# Patient Record
Sex: FEMALE | Race: WHITE | NOT HISPANIC OR LATINO | URBAN - METROPOLITAN AREA
[De-identification: names, ages, dates, MRNs, and addresses within clinical notes are randomized per-mention and may not be internally consistent; named-entity substitution may affect disease eponyms.]

---

## 2024-04-09 ENCOUNTER — OFFICE (OUTPATIENT)
Dept: URBAN - METROPOLITAN AREA CLINIC 4 | Facility: CLINIC | Age: 74
End: 2024-04-09

## 2024-04-09 VITALS
WEIGHT: 136 LBS | SYSTOLIC BLOOD PRESSURE: 150 MMHG | DIASTOLIC BLOOD PRESSURE: 66 MMHG | SYSTOLIC BLOOD PRESSURE: 142 MMHG | DIASTOLIC BLOOD PRESSURE: 72 MMHG

## 2024-04-09 DIAGNOSIS — R94.5 ABNORMAL RESULTS OF LIVER FUNCTION STUDIES: ICD-10-CM

## 2024-04-09 PROCEDURE — 99204 OFFICE O/P NEW MOD 45 MIN: CPT | Performed by: NURSE PRACTITIONER

## 2024-07-25 ENCOUNTER — OFFICE VISIT (OUTPATIENT)
Dept: GASTROENTEROLOGY | Facility: CLINIC | Age: 74
End: 2024-07-25
Payer: MEDICARE

## 2024-07-25 VITALS
OXYGEN SATURATION: 97 % | HEIGHT: 60 IN | HEART RATE: 81 BPM | TEMPERATURE: 98.4 F | SYSTOLIC BLOOD PRESSURE: 128 MMHG | BODY MASS INDEX: 25.13 KG/M2 | DIASTOLIC BLOOD PRESSURE: 70 MMHG | WEIGHT: 128 LBS

## 2024-07-25 DIAGNOSIS — R79.89 ABNORMAL LIVER FUNCTION TESTS: Primary | ICD-10-CM

## 2024-07-25 DIAGNOSIS — M79.604 RIGHT LEG PAIN: ICD-10-CM

## 2024-07-25 DIAGNOSIS — Z80.0 FAMILY HX OF COLON CANCER: ICD-10-CM

## 2024-07-25 PROCEDURE — 1160F RVW MEDS BY RX/DR IN RCRD: CPT | Performed by: INTERNAL MEDICINE

## 2024-07-25 PROCEDURE — 99204 OFFICE O/P NEW MOD 45 MIN: CPT | Performed by: INTERNAL MEDICINE

## 2024-07-25 PROCEDURE — 1159F MED LIST DOCD IN RCRD: CPT | Performed by: INTERNAL MEDICINE

## 2024-07-25 RX ORDER — NICOTINE 21 MG/24HR
PATCH, TRANSDERMAL 24 HOURS TRANSDERMAL EVERY 24 HOURS
COMMUNITY

## 2024-07-25 RX ORDER — CYCLOSPORINE 0.5 MG/ML
EMULSION OPHTHALMIC
COMMUNITY
Start: 2024-05-20

## 2024-07-25 RX ORDER — ATORVASTATIN CALCIUM 40 MG/1
40 TABLET, FILM COATED ORAL DAILY
COMMUNITY

## 2024-07-25 RX ORDER — FLUOXETINE HYDROCHLORIDE 20 MG/1
CAPSULE ORAL
COMMUNITY

## 2024-07-25 RX ORDER — LISINOPRIL 10 MG/1
TABLET ORAL
COMMUNITY

## 2024-07-25 RX ORDER — BRIMONIDINE TARTRATE 2 MG/ML
SOLUTION/ DROPS OPHTHALMIC
COMMUNITY
Start: 2024-06-07

## 2024-07-25 NOTE — PROGRESS NOTES
New Patient Consultation     Patient Name: Emma Harper  : 1950   MRN: [id number]     No chief complaint on file.      History of Present Illness: Emma Harper is a 73 y.o. female, PMH includes ***, who is here today for a Gastroenterology Consultation for ***    Patient denies personal or FHx of PUD, H Pylori, gastritis, pancreatitis, colitis, Celiac disease, UC, Crohn's disease, IBS, colon or gastric cancers. Pt denies EtOH, tobacco, illicit substance or NSAID use.    Last EGD: ***  Last Colon: ***    Subjective      Review of Systems    No past medical history on file.    No past surgical history on file.    No family history on file.    Social History     Socioeconomic History    Marital status:        Social History     Substance and Sexual Activity   Alcohol Use Not on file     Social History     Tobacco Use   Smoking Status Not on file   Smokeless Tobacco Not on file       No current outpatient medications on file.    Not on File    Objective     Physical Exam:  There were no vitals filed for this visit.  There is no height or weight on file to calculate BMI.     Physical Exam    Assessment / Plan      There are no diagnoses linked to this encounter.    Health Maintenance  ***    Follow Up:   No follow-ups on file.    Plan of care reviewed with the patient at the conclusion of today's visit.  Education was provided regarding diagnosis, management, and any prescribed or recommended OTC medications.  Patient verbalized understanding of and agreement with management plan.     NOTE TO PATIENT: The  Century Cures Act makes medical notes like these available to patients in the interest of transparency. However, be advised this is a medical document. It is intended as peer to peer communication. It is written in medical language and may contain abbreviations or verbiage that are unfamiliar. It may appear blunt or direct. Medical documents are intended to carry relevant information, facts as  evident, and the clinical opinion of the practitioner.     Katherin Rios MD   [facility]    Part of this note may be an electronic transcription/translation of spoken language to printed text using the Dragon Dictation System.

## 2024-07-25 NOTE — PROGRESS NOTES
New Patient Consultation     Patient Name: Emma Harper  : 1950   MRN: [id number]     Chief Complaint   Patient presents with    Abnormal Lab       History of Present Illness: Emma Harper is a 73 y.o. female, PMH includes question of polycythemia recorded on notes from Dr. Ribeiro office  (as patient does not remember if she has this)  Patient has difficulty hearing and here with daughter who is here today for a Gastroenterology Consultation for abnormal liver disease work up    She has no jaundice.  No GI symptoms    She drove 3 hours from [place] here and is with daughter    Patient had abnormal liver tests on evaluation for leg pain( level 10) by PCP. Patient has right leg pain the entire leg.  Level 2.  Patient reports can be regional but the entire leg, no deficits in moving it; She does not think sciatica; Not a cramp  She has not have further evaluation for leg pain      This Spring, Patient originally saw  NP with Cristobal Ribeiro MD who had initially started the liver work up.  Patient reports started work up and reports that her liver diagnosis was autoimmune hepatitis and MAFLD.  She was then referred to me from Dr. 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 Pt denies EtOH, tobacco, illicit substance or NSAID use.    Last EGD: none  Last Colon:  Balta Fuentes    Subjective      Review of Systems   Constitutional: Negative.    HENT: Negative.     Eyes: Negative.    Respiratory: Negative.     Cardiovascular: Negative.    Gastrointestinal: Negative.    Endocrine: Negative.    Genitourinary: Negative.     Musculoskeletal: Negative.    Skin: Negative.    Allergic/Immunologic: Negative.    Neurological: Negative.    Hematological: Negative.    Psychiatric/Behavioral: Negative.         No past medical history on file.    Past Surgical History:   Procedure Laterality Date    APPENDECTOMY      COLONOSCOPY      TUBAL ABDOMINAL LIGATION         Family History   Problem Relation Age of Onset    Colon polyps Brother     Colon cancer Brother     Esophageal cancer Neg Hx        Social History     Socioeconomic History    Marital status:    Tobacco Use    Smoking status: Every Day     Current packs/day: 0.25     Types: Cigarettes   Vaping Use    Vaping status: Never Used   Substance and Sexual Activity    Alcohol use: Not Currently    Drug use: Defer    Sexual activity: Defer       Social History     Substance and Sexual Activity   Alcohol Use Not Currently     Social History     Tobacco Use   Smoking Status Every Day    Current packs/day: 0.25    Types: Cigarettes   Smokeless Tobacco Not on file         Current Outpatient Medications:     atorvastatin (LIPITOR) 40 MG tablet, Take 1 tablet by mouth Daily., Disp: , Rfl:     brimonidine (ALPHAGAN) 0.2 % ophthalmic solution, INSTILL 1 DROP INTO BOTH EYES EVERY DAY AS NEEDED, Disp: , Rfl:     FLUoxetine (PROzac) 20 MG capsule, take 1 capsule by mouth every day for 90 days, Disp: , Rfl:     lisinopril (PRINIVIL,ZESTRIL) 10 MG tablet, take 1 tablet by mouth every day for 90 days, Disp: , Rfl:     metoprolol tartrate (LOPRESSOR) 25 MG tablet, Take 1 tablet by mouth 2 (Two) Times a Day With Meals., Disp: , Rfl:     nicotine (Nicoderm CQ) 21 MG/24HR patch, Daily. (Patient not taking: Reported on 7/25/2024), Disp: , Rfl:     Restasis 0.05 % ophthalmic emulsion, INSTILL 1 DROP INTO AFFECTED EYE TWICE A DAY (Patient not taking: Reported on 7/25/2024), Disp: , Rfl:     Allergies   Allergen Reactions    Penicillins Rash       Objective     Physical Exam:  Vitals:    07/25/24 1513  "  BP: 128/70   Pulse: 81   Temp: 98.4 °F (36.9 °C)   SpO2: 97%   Weight: 58.1 kg (128 lb)   Height: 152.4 cm (60\")     Body mass index is 25 kg/m².     Physical Exam  Constitutional:       Appearance: Normal appearance.   Pulmonary:      Effort: Pulmonary effort is normal.      Breath sounds: Normal breath sounds.   Abdominal:      General: Abdomen is flat. Bowel sounds are normal.      Palpations: Abdomen is soft.   Neurological:      General: No focal deficit present.      Mental Status: She is alert.   Psychiatric:         Mood and Affect: Mood normal.         Behavior: Behavior normal.         Assessment / Plan      1. Abnormal liver function tests  Serology concerning for AIH  Explained disease and cirrhosis  Liver biopsy ordered  Refer to [facility] after biopsy      2. Family hx of colon cancer  Last colonoscopy 2022 Dr. Fuentes    3. Right leg pain  Explained the risk benefit of tylenol and nsaids  Encouraged patient to continue work as I do not think it is entirely related to liver disease  Patient told to discuss with her PCP consideration for orthopedic evaluation    Health Maintenance  Last colonoscopy 2022 by Dr. Fuentes    Follow Up:   After biopsy    Plan of care reviewed with the patient at the conclusion of today's visit.  Education was provided regarding diagnosis, management, and any prescribed or recommended OTC medications.  Patient verbalized understanding of and agreement with management plan.     NOTE TO PATIENT: The 21st Century Cures Act makes medical notes like these available to patients in the interest of transparency. However, be advised this is a medical document. It is intended as peer to peer communication. It is written in medical language and may contain abbreviations or verbiage that are unfamiliar. It may appear blunt or direct. Medical documents are intended to carry relevant information, facts as evident, and the clinical opinion of the practitioner.     Katherin Rios MD   [facility]    Part of this note may be an electronic transcription/translation of spoken language to printed text using the Dragon Dictation System.

## 2024-08-14 ENCOUNTER — TELEPHONE (OUTPATIENT)
Dept: INFUSION THERAPY | Facility: HOSPITAL | Age: 74
End: 2024-08-14
Payer: MEDICARE

## 2024-08-14 ENCOUNTER — PREP FOR SURGERY (OUTPATIENT)
Dept: OTHER | Facility: HOSPITAL | Age: 74
End: 2024-08-14
Payer: MEDICARE

## 2024-08-14 RX ORDER — CETIRIZINE HYDROCHLORIDE 10 MG/1
10 TABLET ORAL DAILY
COMMUNITY

## 2024-08-14 RX ORDER — SODIUM CHLORIDE 0.9 % (FLUSH) 0.9 %
10 SYRINGE (ML) INJECTION AS NEEDED
Status: CANCELLED | OUTPATIENT
Start: 2024-08-14

## 2024-08-14 NOTE — TELEPHONE ENCOUNTER
Pre-procedure call- verified appointment time, NPO after midnight, needs a . Reviewed allergies, meds and history and pl;an of care day of procedure.

## 2024-08-16 ENCOUNTER — HOSPITAL ENCOUNTER (OUTPATIENT)
Dept: ULTRASOUND IMAGING | Facility: HOSPITAL | Age: 74
Discharge: HOME | End: 2024-08-16
Payer: MEDICARE

## 2024-08-16 VITALS
HEART RATE: 66 BPM | TEMPERATURE: 97.6 F | WEIGHT: 126.6 LBS | BODY MASS INDEX: 25.52 KG/M2 | DIASTOLIC BLOOD PRESSURE: 52 MMHG | OXYGEN SATURATION: 95 % | RESPIRATION RATE: 13 BRPM | SYSTOLIC BLOOD PRESSURE: 106 MMHG | HEIGHT: 59 IN

## 2024-08-16 DIAGNOSIS — R79.89 ABNORMAL LIVER FUNCTION TESTS: ICD-10-CM

## 2024-08-16 LAB
BASOPHILS # BLD AUTO: 0.11 10*3/MM3 (ref 0–0.2)
BASOPHILS NFR BLD AUTO: 1.3 % (ref 0–1.5)
DEPRECATED RDW RBC AUTO: 44.6 FL (ref 37–54)
EOSINOPHIL # BLD AUTO: 0.16 10*3/MM3 (ref 0–0.4)
EOSINOPHIL NFR BLD AUTO: 1.8 % (ref 0.3–6.2)
ERYTHROCYTE [DISTWIDTH] IN BLOOD BY AUTOMATED COUNT: 14.5 % (ref 12.3–15.4)
HCT VFR BLD AUTO: 48.1 % (ref 34–46.6)
HGB BLD-MCNC: 16 G/DL (ref 12–15.9)
IMM GRANULOCYTES # BLD AUTO: 0.04 10*3/MM3 (ref 0–0.05)
IMM GRANULOCYTES NFR BLD AUTO: 0.5 % (ref 0–0.5)
INR PPP: 1.04 (ref 0.89–1.12)
LYMPHOCYTES # BLD AUTO: 2.93 10*3/MM3 (ref 0.7–3.1)
LYMPHOCYTES NFR BLD AUTO: 33.6 % (ref 19.6–45.3)
MCH RBC QN AUTO: 28.3 PG (ref 26.6–33)
MCHC RBC AUTO-ENTMCNC: 33.3 G/DL (ref 31.5–35.7)
MCV RBC AUTO: 85.1 FL (ref 79–97)
MONOCYTES # BLD AUTO: 0.85 10*3/MM3 (ref 0.1–0.9)
MONOCYTES NFR BLD AUTO: 9.7 % (ref 5–12)
NEUTROPHILS NFR BLD AUTO: 4.63 10*3/MM3 (ref 1.7–7)
NEUTROPHILS NFR BLD AUTO: 53.1 % (ref 42.7–76)
NRBC BLD AUTO-RTO: 0 /100 WBC (ref 0–0.2)
PLATELET # BLD AUTO: 218 10*3/MM3 (ref 140–450)
PMV BLD AUTO: 10 FL (ref 6–12)
PROTHROMBIN TIME: 13.7 SECONDS (ref 12.2–14.5)
RBC # BLD AUTO: 5.65 10*6/MM3 (ref 3.77–5.28)
WBC NRBC COR # BLD AUTO: 8.72 10*3/MM3 (ref 3.4–10.8)

## 2024-08-16 PROCEDURE — 25010000002 MIDAZOLAM PER 1 MG: Performed by: RADIOLOGY

## 2024-08-16 PROCEDURE — 85610 PROTHROMBIN TIME: CPT | Performed by: NURSE PRACTITIONER

## 2024-08-16 PROCEDURE — 25010000002 FENTANYL CITRATE (PF) 50 MCG/ML SOLUTION: Performed by: RADIOLOGY

## 2024-08-16 PROCEDURE — 85025 COMPLETE CBC W/AUTO DIFF WBC: CPT | Performed by: NURSE PRACTITIONER

## 2024-08-16 PROCEDURE — 76942 ECHO GUIDE FOR BIOPSY: CPT

## 2024-08-16 RX ORDER — FENTANYL CITRATE 50 UG/ML
INJECTION, SOLUTION INTRAMUSCULAR; INTRAVENOUS
Status: DISPENSED
Start: 2024-08-16 | End: 2024-08-16

## 2024-08-16 RX ORDER — MIDAZOLAM HYDROCHLORIDE 1 MG/ML
INJECTION INTRAMUSCULAR; INTRAVENOUS
Status: DISPENSED
Start: 2024-08-16 | End: 2024-08-16

## 2024-08-16 RX ORDER — HYDROCODONE BITARTRATE AND ACETAMINOPHEN 5; 325 MG/1; MG/1
1 TABLET ORAL EVERY 4 HOURS PRN
Status: DISCONTINUED | OUTPATIENT
Start: 2024-08-16 | End: 2024-08-17 | Stop reason: HOSPADM

## 2024-08-16 RX ORDER — SODIUM CHLORIDE 0.9 % (FLUSH) 0.9 %
10 SYRINGE (ML) INJECTION AS NEEDED
Status: DISCONTINUED | OUTPATIENT
Start: 2024-08-16 | End: 2024-08-17 | Stop reason: HOSPADM

## 2024-08-16 RX ORDER — LIDOCAINE HYDROCHLORIDE 10 MG/ML
5 INJECTION, SOLUTION EPIDURAL; INFILTRATION; INTRACAUDAL; PERINEURAL ONCE
Status: COMPLETED | OUTPATIENT
Start: 2024-08-16 | End: 2024-08-16

## 2024-08-16 RX ORDER — FENTANYL CITRATE 50 UG/ML
INJECTION, SOLUTION INTRAMUSCULAR; INTRAVENOUS AS NEEDED
Status: COMPLETED | OUTPATIENT
Start: 2024-08-16 | End: 2024-08-16

## 2024-08-16 RX ORDER — MIDAZOLAM HYDROCHLORIDE 1 MG/ML
INJECTION INTRAMUSCULAR; INTRAVENOUS AS NEEDED
Status: COMPLETED | OUTPATIENT
Start: 2024-08-16 | End: 2024-08-16

## 2024-08-16 RX ADMIN — FENTANYL CITRATE 25 MCG: 50 INJECTION, SOLUTION INTRAMUSCULAR; INTRAVENOUS at 09:01

## 2024-08-16 RX ADMIN — MIDAZOLAM HYDROCHLORIDE 0.5 MG: 1 INJECTION, SOLUTION INTRAMUSCULAR; INTRAVENOUS at 08:56

## 2024-08-16 RX ADMIN — LIDOCAINE HYDROCHLORIDE 5 ML: 10 INJECTION, SOLUTION EPIDURAL; INFILTRATION; INTRACAUDAL; PERINEURAL at 09:09

## 2024-08-16 RX ADMIN — MIDAZOLAM HYDROCHLORIDE 0.5 MG: 1 INJECTION, SOLUTION INTRAMUSCULAR; INTRAVENOUS at 09:01

## 2024-08-16 RX ADMIN — FENTANYL CITRATE 25 MCG: 50 INJECTION, SOLUTION INTRAMUSCULAR; INTRAVENOUS at 08:56

## 2024-08-16 NOTE — PRE-PROCEDURE NOTE
"[facility]   Vascular Interventional Radiology  History & Physicial        Patient Name:Emma Harper    : 1950    MRN: [id number]    Primary Care Physician: Mansoor Barragan PA    Referring Physician: Katherin Rios MD     Date of admission: 2024    Subjective     Reason for Consult: Random liver biopsy    History of Present Illness     Emma Harper is a 74 y.o. female referred to IR as noted above.      Active Hospital Problems:  There are no active hospital problems to display for this patient.      Personal History     Past Medical History:   Diagnosis Date    Elevated liver enzymes     High cholesterol     Hypertension        Past Surgical History:   Procedure Laterality Date    APPENDECTOMY      COLONOSCOPY      TUBAL ABDOMINAL LIGATION         Family History: Her family history includes Colon cancer in her brother; Colon polyps in her brother.     Social History: She  reports that she has been smoking cigarettes. She does not have any smokeless tobacco history on file. She reports that she does not currently use alcohol. Drug use questions deferred to the physician.    Home Medications:  FLUoxetine, atorvastatin, brimonidine, cetirizine, cycloSPORINE, lisinopril, metoprolol tartrate, and nicotine    Current Medications:    fentaNYL citrate (PF)    midazolam    sodium chloride     Allergies:  Allergies   Allergen Reactions    Penicillins Rash       Review of Systems    IR Procedure pertinent significant findings are mentioned in the PMH and PSH above.    Objective     Visit Vitals  /69 (BP Location: Left arm, Patient Position: Lying)   Pulse 72   Temp 97.6 °F (36.4 °C)   Resp 16   Ht 149.9 cm (59\")   Wt 57.4 kg (126 lb 9.6 oz)   SpO2 94%   BMI 25.57 kg/m²        Physical Exam    A&Ox3.   Able to communicate  No Apparent Distress  Average physique   CVS: VS as noted. Chart reviewed. Stable for the procedure.  Respiratory: Non labored breathing. No signs of respiratory compromise.    Result " "Review      I have personally reviewed the results from the time of this admission to 8/16/2024 08:41 EDT and agree with these findings.  [x]  Laboratory  []  Microbiology  [x]  Radiology  []  EKG/Telemetry   []  Cardiology/Vascular   []  Pathology  []  Old records  []  Other:    Most notable findings include: As noted:    Results from last 7 days   Lab Units 08/16/24  0812   WBC 10*3/mm3 8.72   HEMOGLOBIN g/dL 16.0*   HEMATOCRIT % 48.1*   PLATELETS 10*3/mm3 218     INR pending.              CrCl cannot be calculated (No successful lab value found.). No results found for: \"CREATININE\"    No results found for: \"COVID19\"     No results found for: \"PREGTESTUR\", \"PREGSERUM\", \"HCG\", \"HCGQUANT\"     ASA SCALE ASSESSMENT (applicable ONLY if sedation planned):   1     MALLAMPATI CLASSIFICATION (applicable ONLY if sedation planned):   1    Assessment / Plan     Emma Harper is a 74 y.o. female referred to the IR service with above problem.    Plan:   As above.    Notice: The note was created before the performance of the procedure. It might have been left in the pending status and signed off after the procedure. The time stamp on the note may be misleading.    Zain Rodriguez MD   Vascular Interventional Radiology  08/16/24   8:40 AM EDT     "

## 2024-08-16 NOTE — POST-PROCEDURE NOTE
The following procedure was performed: Random liver biopsy    Please see corresponding Radiology report for in detail procedural information. The Radiology report will be dictated shortly, if not done so already. Please see the IR RN note for the information regarding medicines administered if any, alejandra-procedural vitals and I/O information.

## 2024-08-19 ENCOUNTER — TELEPHONE (OUTPATIENT)
Dept: INFUSION THERAPY | Facility: HOSPITAL | Age: 74
End: 2024-08-19
Payer: MEDICARE

## 2024-08-20 LAB
CYTO UR: NORMAL
LAB AP CASE REPORT: NORMAL
LAB AP CLINICAL INFORMATION: NORMAL
LAB AP DIAGNOSIS COMMENT: NORMAL
PATH REPORT.FINAL DX SPEC: NORMAL
PATH REPORT.GROSS SPEC: NORMAL

## 2024-08-27 ENCOUNTER — LAB (OUTPATIENT)
Dept: LAB | Facility: HOSPITAL | Age: 74
End: 2024-08-27
Payer: MEDICARE

## 2024-08-27 ENCOUNTER — OFFICE VISIT (OUTPATIENT)
Dept: GASTROENTEROLOGY | Facility: CLINIC | Age: 74
End: 2024-08-27
Payer: MEDICARE

## 2024-08-27 VITALS
HEART RATE: 86 BPM | WEIGHT: 126 LBS | SYSTOLIC BLOOD PRESSURE: 154 MMHG | DIASTOLIC BLOOD PRESSURE: 88 MMHG | OXYGEN SATURATION: 97 % | HEIGHT: 59 IN | BODY MASS INDEX: 25.4 KG/M2

## 2024-08-27 DIAGNOSIS — K75.4 AUTOIMMUNE HEPATITIS: ICD-10-CM

## 2024-08-27 DIAGNOSIS — T38.0X5A ADVERSE EFFECT OF GLUCOCORTICOIDS AND SYNTHETIC ANALOGUES, INITIAL ENCOUNTER: ICD-10-CM

## 2024-08-27 DIAGNOSIS — K75.4 AUTOIMMUNE HEPATITIS: Primary | ICD-10-CM

## 2024-08-27 LAB
ALBUMIN SERPL-MCNC: 4 G/DL (ref 3.5–5.2)
ALBUMIN/GLOB SERPL: 1 G/DL
ALP SERPL-CCNC: 139 U/L (ref 39–117)
ALT SERPL W P-5'-P-CCNC: 52 U/L (ref 1–33)
ANION GAP SERPL CALCULATED.3IONS-SCNC: 7.2 MMOL/L (ref 5–15)
AST SERPL-CCNC: 57 U/L (ref 1–32)
BASOPHILS # BLD AUTO: 0.1 10*3/MM3 (ref 0–0.2)
BASOPHILS NFR BLD AUTO: 1 % (ref 0–1.5)
BILIRUB SERPL-MCNC: 0.5 MG/DL (ref 0–1.2)
BUN SERPL-MCNC: 6 MG/DL (ref 8–23)
BUN/CREAT SERPL: 8.6 (ref 7–25)
CALCIUM SPEC-SCNC: 9.2 MG/DL (ref 8.6–10.5)
CHLORIDE SERPL-SCNC: 104 MMOL/L (ref 98–107)
CO2 SERPL-SCNC: 24.8 MMOL/L (ref 22–29)
CREAT SERPL-MCNC: 0.7 MG/DL (ref 0.57–1)
DEPRECATED RDW RBC AUTO: 42.6 FL (ref 37–54)
EGFRCR SERPLBLD CKD-EPI 2021: 91.5 ML/MIN/1.73
EOSINOPHIL # BLD AUTO: 0.1 10*3/MM3 (ref 0–0.4)
EOSINOPHIL NFR BLD AUTO: 1 % (ref 0.3–6.2)
ERYTHROCYTE [DISTWIDTH] IN BLOOD BY AUTOMATED COUNT: 13.7 % (ref 12.3–15.4)
GLOBULIN UR ELPH-MCNC: 4 GM/DL
GLUCOSE SERPL-MCNC: 113 MG/DL (ref 65–99)
HCT VFR BLD AUTO: 48.1 % (ref 34–46.6)
HGB BLD-MCNC: 16.2 G/DL (ref 12–15.9)
IMM GRANULOCYTES # BLD AUTO: 0.05 10*3/MM3 (ref 0–0.05)
IMM GRANULOCYTES NFR BLD AUTO: 0.5 % (ref 0–0.5)
INR PPP: 1.04 (ref 0.89–1.12)
LYMPHOCYTES # BLD AUTO: 1.83 10*3/MM3 (ref 0.7–3.1)
LYMPHOCYTES NFR BLD AUTO: 19.1 % (ref 19.6–45.3)
MCH RBC QN AUTO: 28.7 PG (ref 26.6–33)
MCHC RBC AUTO-ENTMCNC: 33.7 G/DL (ref 31.5–35.7)
MCV RBC AUTO: 85.3 FL (ref 79–97)
MONOCYTES # BLD AUTO: 0.57 10*3/MM3 (ref 0.1–0.9)
MONOCYTES NFR BLD AUTO: 6 % (ref 5–12)
NEUTROPHILS NFR BLD AUTO: 6.92 10*3/MM3 (ref 1.7–7)
NEUTROPHILS NFR BLD AUTO: 72.4 % (ref 42.7–76)
NRBC BLD AUTO-RTO: 0 /100 WBC (ref 0–0.2)
PLATELET # BLD AUTO: 226 10*3/MM3 (ref 140–450)
PMV BLD AUTO: 9.7 FL (ref 6–12)
POTASSIUM SERPL-SCNC: 4.2 MMOL/L (ref 3.5–5.2)
PROT SERPL-MCNC: 8 G/DL (ref 6–8.5)
PROTHROMBIN TIME: 13.7 SECONDS (ref 12.2–14.5)
RBC # BLD AUTO: 5.64 10*6/MM3 (ref 3.77–5.28)
SODIUM SERPL-SCNC: 136 MMOL/L (ref 136–145)
WBC NRBC COR # BLD AUTO: 9.57 10*3/MM3 (ref 3.4–10.8)

## 2024-08-27 PROCEDURE — 1159F MED LIST DOCD IN RCRD: CPT | Performed by: INTERNAL MEDICINE

## 2024-08-27 PROCEDURE — 84433 ASY THIOPURIN S-MTHYLTRNSFRS: CPT

## 2024-08-27 PROCEDURE — 36415 COLL VENOUS BLD VENIPUNCTURE: CPT | Performed by: INTERNAL MEDICINE

## 2024-08-27 PROCEDURE — 86682 HELMINTH ANTIBODY: CPT | Performed by: INTERNAL MEDICINE

## 2024-08-27 PROCEDURE — 1160F RVW MEDS BY RX/DR IN RCRD: CPT | Performed by: INTERNAL MEDICINE

## 2024-08-27 PROCEDURE — 86480 TB TEST CELL IMMUN MEASURE: CPT

## 2024-08-27 PROCEDURE — 99214 OFFICE O/P EST MOD 30 MIN: CPT | Performed by: INTERNAL MEDICINE

## 2024-08-27 PROCEDURE — 85025 COMPLETE CBC W/AUTO DIFF WBC: CPT

## 2024-08-27 PROCEDURE — 80053 COMPREHEN METABOLIC PANEL: CPT

## 2024-08-27 PROCEDURE — 85610 PROTHROMBIN TIME: CPT

## 2024-08-27 RX ORDER — PREDNISONE 5 MG/1
TABLET ORAL
Qty: 231 TABLET | Refills: 0 | Status: SHIPPED | OUTPATIENT
Start: 2024-08-27 | End: 2024-10-15

## 2024-08-27 NOTE — PROGRESS NOTES
Follow Up      Patient Name: Emma Harper  : 1950   MRN: [id number]     Chief Complaint   Patient presents with    Follow-up     Abnormal liver test        History of Present Illness: Emma Harper is a 74 y.o. female, PMH includes ***, who is here today for follow up on ***    Last EGD: ***  Last Colon: ***    Subjective      Review of Systems      Current Outpatient Medications:     atorvastatin (LIPITOR) 40 MG tablet, Take 1 tablet by mouth Daily., Disp: , Rfl:     brimonidine (ALPHAGAN) 0.2 % ophthalmic solution, INSTILL 1 DROP INTO BOTH EYES EVERY DAY AS NEEDED, Disp: , Rfl:     cetirizine (zyrTEC) 10 MG tablet, Take 1 tablet by mouth Daily., Disp: , Rfl:     FLUoxetine (PROzac) 20 MG capsule, take 1 capsule by mouth every day for 90 days, Disp: , Rfl:     lisinopril (PRINIVIL,ZESTRIL) 10 MG tablet, take 1 tablet by mouth every day for 90 days, Disp: , Rfl:     metoprolol tartrate (LOPRESSOR) 25 MG tablet, Take 1 tablet by mouth 2 (Two) Times a Day With Meals., Disp: , Rfl:     nicotine (Nicoderm CQ) 21 MG/24HR patch, Daily. (Patient not taking: Reported on 2024), Disp: , Rfl:     Restasis 0.05 % ophthalmic emulsion, INSTILL 1 DROP INTO AFFECTED EYE TWICE A DAY (Patient not taking: Reported on 2024), Disp: , Rfl:     Allergies   Allergen Reactions    Penicillins Rash       Social History     Socioeconomic History    Marital status:    Tobacco Use    Smoking status: Every Day     Current packs/day: 0.25     Types: Cigarettes   Vaping Use    Vaping status: Never Used   Substance and Sexual Activity    Alcohol use: Not Currently    Drug use: Defer    Sexual activity: Defer        Past Surgical History:   Procedure Laterality Date    APPENDECTOMY      COLONOSCOPY      TUBAL ABDOMINAL LIGATION          Past Medical History:   Diagnosis Date    Elevated liver enzymes     High cholesterol     Hypertension         Objective     Physical Exam:  There were no vitals filed for this  visit.  There is no height or weight on file to calculate BMI.     Physical Exam    Assessment / Plan      Assessment/Plan:   There are no diagnoses linked to this encounter.    Health Maintenance  ***    Follow Up:   No follow-ups on file.    Plan of care reviewed with the patient at the conclusion of today's visit.  Education was provided regarding diagnosis, management, and any prescribed or recommended OTC medications.  Patient verbalized understanding of and agreement with management plan.     NOTE TO PATIENT: The 21st Century Cures Act makes medical notes like these available to patients in the interest of transparency. However, be advised this is a medical document. It is intended as peer to peer communication. It is written in medical language and may contain abbreviations or verbiage that are unfamiliar. It may appear blunt or direct. Medical documents are intended to carry relevant information, facts as evident, and the clinical opinion of the practitioner.     Katherin Rios MD   [facility]    Part of this note may be an electronic transcription/translation of spoken language to printed text using the Dragon Dictation System.

## 2024-08-27 NOTE — PROGRESS NOTES
Follow Up      Patient Name: Emma Harper  : 1950   MRN: [id number]     Chief Complaint   Patient presents with    Follow-up     Abnormal liver test        History of Present Illness: Emma Harper is a 74 y.o. female, PMH includes dysplipidemia, htn worked as a , who is here today for follow up on liver biopsy that was consistent with autoimmune hepatitis  Patient reports feeling better.  Her leg pain has resolved.  She reports tolerated procedure well and was really complimentary of staff in IR  Prior to biopsy she saw NP with Dr. Ribeiro who found these:  Positive TIP and SMA of 91; elevated Igg of 2783; Negative AMA AST 96 and ALT 99; Alk phos 170' Fibrosure severe steatosis/LUCERO and satage 2 fibrosis (BMI + 26)       Last EGD: none  Last Colon: Gina did it     Subjective      Review of Systems   Constitutional: Negative.  Negative for activity change.   HENT: Negative.     Eyes: Negative.          Current Outpatient Medications:     atorvastatin (LIPITOR) 40 MG tablet, Take 1 tablet by mouth Daily., Disp: , Rfl:     brimonidine (ALPHAGAN) 0.2 % ophthalmic solution, INSTILL 1 DROP INTO BOTH EYES EVERY DAY AS NEEDED, Disp: , Rfl:     cetirizine (zyrTEC) 10 MG tablet, Take 1 tablet by mouth Daily., Disp: , Rfl:     FLUoxetine (PROzac) 20 MG capsule, take 1 capsule by mouth every day for 90 days, Disp: , Rfl:     lisinopril (PRINIVIL,ZESTRIL) 10 MG tablet, take 1 tablet by mouth every day for 90 days, Disp: , Rfl:     metoprolol tartrate (LOPRESSOR) 25 MG tablet, Take 1 tablet by mouth 2 (Two) Times a Day With Meals., Disp: , Rfl:     nicotine (Nicoderm CQ) 21 MG/24HR patch, Daily. (Patient not taking: Reported on 2024), Disp: , Rfl:     Restasis 0.05 % ophthalmic emulsion, INSTILL 1 DROP INTO AFFECTED EYE TWICE A DAY (Patient not taking: Reported on 2024), Disp: , Rfl:     Allergies   Allergen Reactions    Penicillins Rash       Social History     Socioeconomic History    Marital  "status:    Tobacco Use    Smoking status: Every Day     Current packs/day: 0.25     Types: Cigarettes   Vaping Use    Vaping status: Never Used   Substance and Sexual Activity    Alcohol use: Not Currently    Drug use: Defer    Sexual activity: Defer        Past Surgical History:   Procedure Laterality Date    APPENDECTOMY      COLONOSCOPY      TUBAL ABDOMINAL LIGATION          Past Medical History:   Diagnosis Date    Elevated liver enzymes     High cholesterol     Hypertension         Objective     Physical Exam:  Vitals:    08/27/24 0817   BP: 154/88   Pulse: 86   SpO2: 97%   Weight: 57.2 kg (126 lb)   Height: 149.9 cm (59.02\")     Body mass index is 25.44 kg/m².     Physical Exam  Constitutional:       Appearance: Normal appearance.   Neurological:      General: No focal deficit present.      Mental Status: She is alert and oriented to person, place, and time.   Psychiatric:         Mood and Affect: Mood normal.         Behavior: Behavior normal.       Clinical Information    Abnormal liver function tests   Final Diagnosis   LIVER, NEEDLE CORE BIOPSY:  Chronic portal hepatitis with plasma cells, grade 2, at least stage 1; see comment.   Electronically signed by Bob Gates MD on 8/20/2024 at 1531   Comment    As detailed in the microscopic description, the sample as some limitations predominantly related to fragmentation. Nonetheless, based on what is present, the liver shows a chronic portal hepatitis with plasma cells and at least some degree of interface activity. This would be compatible with a diagnosis of autoimmune hepatitis in the presence of otherwise supportive clinical and laboratory findings, although the histologic findings and cells are not entirely specific.   Gross Description    1. Liver.  Received in formalin labeled \"liver biopsy\" are 3 tan-white needle cores averaging 1.2 cm 1.2 cm long by 0.1 cm in diameter submitted entirely in Block 1A. HDM      Microscopic Description  "   Histologic sections demonstrate several fragments of needle core biopsies of the liver with a limited number of portal tracts for review. The portal tracts are involved by inflammation which appears predominantly to be lymphocytes with occasional plasma cells, although many portal tracts have crush artifact. It is favored there is some interface activity present but this is hard to establish with the fragmentation. Occasional bile ducts are seen without injury. The hepatic lobules demonstrate mild steatosis, around 15%, with no definitive balloon cell degeneration identified. A granulomas or acidophil bodies are appreciated. Special staining is performed with appropriately reactive controls. An iron stain is essentially negative. Trichrome stain demonstrates portal fibrosis but no definitive periportal fibrosis; the fragmentation makes assessment of the architecture of the extent of fibrous tissue difficult.   Resulting Agency [facility]              Specimen Collected: 08/16/24 09:09 EDT Last Resulted: 08/20/24 15:31 EDT              Assessment / Plan      Assessment/Plan:   1. Autoimmune hepatitis  Biopsy provenPositive TIP and SMA of 91; elevated Igg of 2783; Negative AMA  Discussed treatment options and  with her age and frame will need  Discussed risk on non treatment  Discussed risk of cirrhosis development     - QuantiFERON TB Plus Client Incubated; Future  - Comprehensive Metabolic Panel; Future  - CBC & Differential; Future  - Protime-INR; Future  - Thiopurine Methyltransferase; Future  - Strongyloides Antibody IgG, CYNTHIA  - need baseline DEXA scan      Will order  Prednisone 30 for one week then 20 for 2 weeks then 15 for 2 weeks then 10 mg for 1 weeks then recheck liver test in 4 to 6 weeks will plan on starting azathioprine once we know her metabolite issue  Discussed budesonide as well if she can not tolerate above    She does NOT have Cirrhosis but she was given information and would limit tylenol  and would encourage increased protein in diet    Will place [facility] referral to hepatology     She has had pneumovax and shingles; I strongly encourage covid booster and patient refuses.  Discussed how infection can acutely worsen liver function  Would consider repeat liver and spleen imaging in about 6 months      Health Maintenance  Last colonoscopy by Dr. Fuentes    Follow Up:   November or december    Plan of care reviewed with the patient at the conclusion of today's visit.  Education was provided regarding diagnosis, management, and any prescribed or recommended OTC medications.  Patient verbalized understanding of and agreement with management plan.     NOTE TO PATIENT: The 21st Century Cures Act makes medical notes like these available to patients in the interest of transparency. However, be advised this is a medical document. It is intended as peer to peer communication. It is written in medical language and may contain abbreviations or verbiage that are unfamiliar. It may appear blunt or direct. Medical documents are intended to carry relevant information, facts as evident, and the clinical opinion of the practitioner.     Katherin Rios MD   [facility] [place] [facility]    Part of this note may be an electronic transcription/translation of spoken language to printed text using the Dragon Dictation System.

## 2024-08-29 LAB
GAMMA INTERFERON BACKGROUND BLD IA-ACNC: 0.04 IU/ML
M TB IFN-G BLD-IMP: NEGATIVE
M TB IFN-G CD4+ BCKGRND COR BLD-ACNC: 0.04 IU/ML
M TB IFN-G CD4+CD8+ BCKGRND COR BLD-ACNC: 0.06 IU/ML
MITOGEN IGNF BCKGRD COR BLD-ACNC: >10 IU/ML
SERVICE CMNT-IMP: NORMAL
STRONGYLOIDES IGG SER QL IA: NEGATIVE

## 2024-09-03 DIAGNOSIS — K75.4 AUTOIMMUNE HEPATITIS: Primary | ICD-10-CM

## 2024-09-03 LAB
REF LAB TEST METHOD: NORMAL
TPMT GENE PROD MET ACT IMP BLD/T-IMP: NORMAL
TPMT RBC-CCNC: 11.9 UNITS/ML RBC

## 2024-10-15 ENCOUNTER — TELEPHONE (OUTPATIENT)
Dept: GASTROENTEROLOGY | Facility: CLINIC | Age: 74
End: 2024-10-15
Payer: MEDICARE

## 2024-10-15 DIAGNOSIS — Z78.0 MENOPAUSE: Primary | ICD-10-CM

## 2024-10-15 NOTE — TELEPHONE ENCOUNTER
Plan would be to start low dose azathioprine to replace the steroids.    I ordered labs 9/3 and want to look at these first   I reordered her DEXA

## 2024-10-15 NOTE — TELEPHONE ENCOUNTER
Patient states she is almost finished with the prednisone, and doesn't know what to do next?    Patient is unsure if you still want bloodwork done?

## 2024-10-18 DIAGNOSIS — K75.4 AUTOIMMUNE HEPATITIS: Primary | ICD-10-CM

## 2024-10-18 RX ORDER — AZATHIOPRINE 50 MG/1
50 TABLET ORAL DAILY
Qty: 30 TABLET | Refills: 1 | Status: SHIPPED | OUTPATIENT
Start: 2024-10-18

## 2024-10-18 RX ORDER — PREDNISONE 5 MG/1
5 TABLET ORAL DAILY
Qty: 14 TABLET | Refills: 0 | Status: SHIPPED | OUTPATIENT
Start: 2024-10-18

## 2024-10-18 NOTE — TELEPHONE ENCOUNTER
Please let patient know that instead of steroids I would like to transition her now to azathioprine 50  mg po daily.  I will order this. She will take 14 more day of steroids and start this with azathioprine

## 2024-10-18 NOTE — TELEPHONE ENCOUNTER
Rx Refill Note  Pending Prescriptions:                       Disp   Refills    predniSONE (DELTASONE) 5 MG tablet [Pharma*231 ta*0        Sig: PLEASE SEE ATTACHED FOR DETAILED DIRECTIONS    Last office visit with prescribing clinician: 8/27/2024   Last telemedicine visit with prescribing clinician: Visit date not found   Next office visit with prescribing clinician: 11/7/2024         Eleanor Odell MA  10/18/24, 09:16 EDT

## 2024-10-21 ENCOUNTER — PRIOR AUTHORIZATION (OUTPATIENT)
Dept: GASTROENTEROLOGY | Facility: CLINIC | Age: 74
End: 2024-10-21
Payer: MEDICARE

## 2024-10-21 NOTE — TELEPHONE ENCOUNTER
PA Case: [id number], Status: Approved, Coverage Starts on: 7/23/2024 12:00:00 AM, Coverage Ends on: 10/21/2025 12:00:00 AM.

## 2024-11-12 ENCOUNTER — OFFICE VISIT (OUTPATIENT)
Dept: GASTROENTEROLOGY | Facility: CLINIC | Age: 74
End: 2024-11-12
Payer: MEDICARE

## 2024-11-12 VITALS
BODY MASS INDEX: 27.13 KG/M2 | OXYGEN SATURATION: 97 % | HEIGHT: 59 IN | SYSTOLIC BLOOD PRESSURE: 134 MMHG | WEIGHT: 134.6 LBS | DIASTOLIC BLOOD PRESSURE: 64 MMHG

## 2024-11-12 DIAGNOSIS — K75.4 AUTOIMMUNE HEPATITIS: ICD-10-CM

## 2024-11-12 PROCEDURE — 1160F RVW MEDS BY RX/DR IN RCRD: CPT | Performed by: INTERNAL MEDICINE

## 2024-11-12 PROCEDURE — 1159F MED LIST DOCD IN RCRD: CPT | Performed by: INTERNAL MEDICINE

## 2024-11-12 PROCEDURE — 99213 OFFICE O/P EST LOW 20 MIN: CPT | Performed by: INTERNAL MEDICINE

## 2024-11-12 RX ORDER — AZATHIOPRINE 50 MG/1
50 TABLET ORAL DAILY
Qty: 90 TABLET | Refills: 1 | Status: SHIPPED | OUTPATIENT
Start: 2024-11-12

## 2024-11-12 NOTE — PROGRESS NOTES
Follow Up      Patient Name: Emma Harper  : 1950   MRN: [id number]     Chief Complaint   Patient presents with    Follow-up       History of Present Illness: Emma Harper is a 74 y.o. female, PMH includes biopsy proven autoimmune hepatitis, who is here today for follow up on reports has had slight headache since taking medication.  Patient denies any stomach ache.  Patient has a daily headache.  Patient does not take anything for it.    Her clinic was first to get EMR  Patient was referred to [facility] as well who agreed with current plan to wean down steroid and start azathioprine  Patient here with daughter and they agree with plan  I had referred her to hepatology since we do not offer transplant and just in case she were to decompensate    Last EGD: none  Last Colon: G[facility] at [facility]     Subjective      Review of Systems      Current Outpatient Medications:     atorvastatin (LIPITOR) 40 MG tablet, Take 1 tablet by mouth Daily., Disp: , Rfl:     azaTHIOprine (IMURAN) 50 MG tablet, Take 1 tablet by mouth Daily., Disp: 30 tablet, Rfl: 1    brimonidine (ALPHAGAN) 0.2 % ophthalmic solution, INSTILL 1 DROP INTO BOTH EYES EVERY DAY AS NEEDED, Disp: , Rfl:     cetirizine (zyrTEC) 10 MG tablet, Take 1 tablet by mouth Daily., Disp: , Rfl:     FLUoxetine (PROzac) 20 MG capsule, take 1 capsule by mouth every day for 90 days, Disp: , Rfl:     lisinopril (PRINIVIL,ZESTRIL) 10 MG tablet, take 1 tablet by mouth every day for 90 days, Disp: , Rfl:     metoprolol tartrate (LOPRESSOR) 25 MG tablet, Take 1 tablet by mouth 2 (Two) Times a Day With Meals., Disp: , Rfl:     nicotine (Nicoderm CQ) 21 MG/24HR patch, Daily., Disp: , Rfl:     predniSONE (DELTASONE) 5 MG tablet, Take 1 tablet by mouth Daily. Please see attached for detailed directions, Disp: 14 tablet, Rfl: 0    Restasis 0.05 % ophthalmic emulsion, , Disp: , Rfl:     Allergies   Allergen Reactions    Penicillins Rash       Social History  "    Socioeconomic History    Marital status:    Tobacco Use    Smoking status: Every Day     Current packs/day: 0.25     Types: Cigarettes   Vaping Use    Vaping status: Never Used   Substance and Sexual Activity    Alcohol use: Not Currently    Drug use: Defer    Sexual activity: Defer        Past Surgical History:   Procedure Laterality Date    APPENDECTOMY      COLONOSCOPY      TUBAL ABDOMINAL LIGATION          Past Medical History:   Diagnosis Date    Elevated liver enzymes     High cholesterol     Hypertension         Objective     Physical Exam:  Vitals:    11/12/24 1206   BP: 134/64   SpO2: 97%   Weight: 61.1 kg (134 lb 9.6 oz)   Height: 149.9 cm (59.02\")     Body mass index is 27.17 kg/m².     Physical Exam    Assessment / Plan      Assessment/Plan:   1. Autoimmune hepatitis  Biopsy proven; positive TIP and SMA of 91; elevated IgG at 2783;  negative AMA    - azaTHIOprine (IMURAN) 50 MG tablet; Take 1 tablet by mouth Daily.  Dispense: 90 tablet; Refill: 1     will get labs dec so I will not repeat;  I sent her to  hepatology just in case    Labs October were normal      She can take tylenol for headaches but limit 2 grams per 24 hours    Eyes check   Dexa scan per PCP  Discussed skin check    April liver ultrasound    Health Maintenance  Last colonoscopy in 2022    Follow Up:   Feb 2025    Plan of care reviewed with the patient at the conclusion of today's visit.  Education was provided regarding diagnosis, management, and any prescribed or recommended OTC medications.  Patient verbalized understanding of and agreement with management plan.     NOTE TO PATIENT: The 21st Century Cures Act makes medical notes like these available to patients in the interest of transparency. However, be advised this is a medical document. It is intended as peer to peer communication. It is written in medical language and may contain abbreviations or verbiage that are unfamiliar. It may appear blunt or direct. Medical " documents are intended to carry relevant information, facts as evident, and the clinical opinion of the practitioner.     Katherin Rios MD   [facility]    Part of this note may be an electronic transcription/translation of spoken language to printed text using the Dragon Dictation System.

## 2024-11-18 LAB
CYTO UR: NORMAL
LAB AP CASE REPORT: NORMAL
LAB AP CLINICAL INFORMATION: NORMAL
LAB AP DIAGNOSIS COMMENT: NORMAL
LAB AP OUTSIDE REPORT, ADDENDUM: NORMAL
PATH REPORT.FINAL DX SPEC: NORMAL
PATH REPORT.GROSS SPEC: NORMAL

## 2024-12-09 ENCOUNTER — TELEPHONE (OUTPATIENT)
Dept: GASTROENTEROLOGY | Facility: CLINIC | Age: 74
End: 2024-12-09
Payer: MEDICARE

## 2024-12-09 DIAGNOSIS — K75.4 AUTOIMMUNE HEPATITIS: Primary | ICD-10-CM

## 2024-12-09 NOTE — TELEPHONE ENCOUNTER
Contacted patient.  Advised of message from Dr. Rios, patient voiced understanding.    Patient requested lab order be faxed to [facility] in [facility].      Order faxed to:  [phone]

## 2024-12-09 NOTE — TELEPHONE ENCOUNTER
Patient contacted the office to discuss possible side effects from the Imuran.    Patient stated Imuran on 11/13/24.    November 15-23 patient had vomiting and diarrhea.    (Her family had a virus, so she is unsure if she also caught the virus, or if she had medication side effects.)    Patient stopped Imuran on 11/25/24.  Imuran gave no help.    Patient states today is 15 days of not taking Imuran, and today she is feeling fine.    Patient wants to know how to proceed?

## 2024-12-12 ENCOUNTER — TELEPHONE (OUTPATIENT)
Dept: GASTROENTEROLOGY | Facility: CLINIC | Age: 74
End: 2024-12-12
Payer: MEDICARE

## 2024-12-12 ENCOUNTER — HOSPITAL ENCOUNTER (OUTPATIENT)
Facility: HOSPITAL | Age: 74
Discharge: HOME | End: 2024-12-15
Admitting: INTERNAL MEDICINE
Payer: MEDICARE

## 2024-12-12 PROCEDURE — G0378 HOSPITAL OBSERVATION PER HR: HCPCS

## 2024-12-13 PROBLEM — D72.829 LEUKOCYTOSIS: Status: ACTIVE | Noted: 2024-12-13

## 2024-12-13 PROBLEM — I95.9 HYPOTENSION: Status: ACTIVE | Noted: 2024-12-13

## 2024-12-13 PROBLEM — R19.7 DIARRHEA: Status: ACTIVE | Noted: 2024-12-13

## 2024-12-13 PROBLEM — R11.10 VOMITING: Status: ACTIVE | Noted: 2024-12-13

## 2024-12-13 PROBLEM — K75.4 AUTOIMMUNE HEPATITIS: Status: ACTIVE | Noted: 2024-12-13

## 2024-12-13 LAB
ALBUMIN SERPL-MCNC: 3.4 G/DL (ref 3.5–5.2)
ALBUMIN/GLOB SERPL: 0.9 G/DL
ALP SERPL-CCNC: 159 U/L (ref 39–117)
ALT SERPL W P-5'-P-CCNC: 150 U/L (ref 1–33)
ANION GAP SERPL CALCULATED.3IONS-SCNC: 10 MMOL/L (ref 5–15)
AST SERPL-CCNC: 150 U/L (ref 1–32)
BASOPHILS # BLD AUTO: 0.08 10*3/MM3 (ref 0–0.2)
BASOPHILS NFR BLD AUTO: 0.7 % (ref 0–1.5)
BILIRUB SERPL-MCNC: 0.7 MG/DL (ref 0–1.2)
BILIRUB UR QL STRIP: NEGATIVE
BUN SERPL-MCNC: 12 MG/DL (ref 8–23)
BUN/CREAT SERPL: 19 (ref 7–25)
CALCIUM SPEC-SCNC: 8.5 MG/DL (ref 8.6–10.5)
CHLORIDE SERPL-SCNC: 107 MMOL/L (ref 98–107)
CLARITY UR: CLEAR
CO2 SERPL-SCNC: 22 MMOL/L (ref 22–29)
COLOR UR: YELLOW
CREAT SERPL-MCNC: 0.63 MG/DL (ref 0.57–1)
D-LACTATE SERPL-SCNC: 1 MMOL/L (ref 0.5–2)
DEPRECATED RDW RBC AUTO: 48 FL (ref 37–54)
EGFRCR SERPLBLD CKD-EPI 2021: 93.8 ML/MIN/1.73
EOSINOPHIL # BLD AUTO: 0.14 10*3/MM3 (ref 0–0.4)
EOSINOPHIL NFR BLD AUTO: 1.3 % (ref 0.3–6.2)
ERYTHROCYTE [DISTWIDTH] IN BLOOD BY AUTOMATED COUNT: 14.6 % (ref 12.3–15.4)
GLOBULIN UR ELPH-MCNC: 3.8 GM/DL
GLUCOSE SERPL-MCNC: 107 MG/DL (ref 65–99)
GLUCOSE UR STRIP-MCNC: NEGATIVE MG/DL
HCT VFR BLD AUTO: 44.5 % (ref 34–46.6)
HGB BLD-MCNC: 14.3 G/DL (ref 12–15.9)
HGB UR QL STRIP.AUTO: NEGATIVE
IMM GRANULOCYTES # BLD AUTO: 0.07 10*3/MM3 (ref 0–0.05)
IMM GRANULOCYTES NFR BLD AUTO: 0.6 % (ref 0–0.5)
INR PPP: 1.18 (ref 0.89–1.12)
KETONES UR QL STRIP: NEGATIVE
LEUKOCYTE ESTERASE UR QL STRIP.AUTO: NEGATIVE
LIPASE SERPL-CCNC: 28 U/L (ref 13–60)
LYMPHOCYTES # BLD AUTO: 1.39 10*3/MM3 (ref 0.7–3.1)
LYMPHOCYTES NFR BLD AUTO: 12.9 % (ref 19.6–45.3)
MAGNESIUM SERPL-MCNC: 1.6 MG/DL (ref 1.6–2.4)
MCH RBC QN AUTO: 28.9 PG (ref 26.6–33)
MCHC RBC AUTO-ENTMCNC: 32.1 G/DL (ref 31.5–35.7)
MCV RBC AUTO: 90.1 FL (ref 79–97)
MONOCYTES # BLD AUTO: 0.58 10*3/MM3 (ref 0.1–0.9)
MONOCYTES NFR BLD AUTO: 5.4 % (ref 5–12)
NEUTROPHILS NFR BLD AUTO: 79.1 % (ref 42.7–76)
NEUTROPHILS NFR BLD AUTO: 8.54 10*3/MM3 (ref 1.7–7)
NITRITE UR QL STRIP: NEGATIVE
NRBC BLD AUTO-RTO: 0 /100 WBC (ref 0–0.2)
PH UR STRIP.AUTO: <=5 [PH] (ref 5–8)
PHOSPHATE SERPL-MCNC: 3.4 MG/DL (ref 2.5–4.5)
PLATELET # BLD AUTO: 180 10*3/MM3 (ref 140–450)
PMV BLD AUTO: 10 FL (ref 6–12)
POTASSIUM SERPL-SCNC: 3.5 MMOL/L (ref 3.5–5.2)
PROCALCITONIN SERPL-MCNC: 0.67 NG/ML (ref 0–0.25)
PROT SERPL-MCNC: 7.2 G/DL (ref 6–8.5)
PROT UR QL STRIP: NEGATIVE
PROTHROMBIN TIME: 15.1 SECONDS (ref 12.2–14.5)
RBC # BLD AUTO: 4.94 10*6/MM3 (ref 3.77–5.28)
SODIUM SERPL-SCNC: 139 MMOL/L (ref 136–145)
SP GR UR STRIP: 1.02 (ref 1–1.03)
UROBILINOGEN UR QL STRIP: NORMAL
WBC NRBC COR # BLD AUTO: 10.8 10*3/MM3 (ref 3.4–10.8)

## 2024-12-13 PROCEDURE — 85025 COMPLETE CBC W/AUTO DIFF WBC: CPT | Performed by: INTERNAL MEDICINE

## 2024-12-13 PROCEDURE — 84145 PROCALCITONIN (PCT): CPT | Performed by: INTERNAL MEDICINE

## 2024-12-13 PROCEDURE — 83605 ASSAY OF LACTIC ACID: CPT | Performed by: INTERNAL MEDICINE

## 2024-12-13 PROCEDURE — 85610 PROTHROMBIN TIME: CPT | Performed by: INTERNAL MEDICINE

## 2024-12-13 PROCEDURE — 83735 ASSAY OF MAGNESIUM: CPT | Performed by: INTERNAL MEDICINE

## 2024-12-13 PROCEDURE — 87040 BLOOD CULTURE FOR BACTERIA: CPT | Performed by: INTERNAL MEDICINE

## 2024-12-13 PROCEDURE — 63710000001 BRIMONIDINE 0.2 % SOLUTION 5 ML BOTTLE: Performed by: INTERNAL MEDICINE

## 2024-12-13 PROCEDURE — 99214 OFFICE O/P EST MOD 30 MIN: CPT | Performed by: PHYSICIAN ASSISTANT

## 2024-12-13 PROCEDURE — 81003 URINALYSIS AUTO W/O SCOPE: CPT | Performed by: INTERNAL MEDICINE

## 2024-12-13 PROCEDURE — 63710000001 FLUOXETINE 20 MG CAPSULE: Performed by: INTERNAL MEDICINE

## 2024-12-13 PROCEDURE — A9270 NON-COVERED ITEM OR SERVICE: HCPCS | Performed by: INTERNAL MEDICINE

## 2024-12-13 PROCEDURE — 83690 ASSAY OF LIPASE: CPT | Performed by: INTERNAL MEDICINE

## 2024-12-13 PROCEDURE — 80053 COMPREHEN METABOLIC PANEL: CPT | Performed by: INTERNAL MEDICINE

## 2024-12-13 PROCEDURE — G0378 HOSPITAL OBSERVATION PER HR: HCPCS

## 2024-12-13 PROCEDURE — 84100 ASSAY OF PHOSPHORUS: CPT | Performed by: INTERNAL MEDICINE

## 2024-12-13 RX ORDER — ONDANSETRON 4 MG/1
4 TABLET, ORALLY DISINTEGRATING ORAL EVERY 6 HOURS PRN
Status: DISCONTINUED | OUTPATIENT
Start: 2024-12-13 | End: 2024-12-15 | Stop reason: HOSPADM

## 2024-12-13 RX ORDER — SODIUM CHLORIDE 0.9 % (FLUSH) 0.9 %
10 SYRINGE (ML) INJECTION EVERY 12 HOURS SCHEDULED
Status: DISCONTINUED | OUTPATIENT
Start: 2024-12-13 | End: 2024-12-15 | Stop reason: HOSPADM

## 2024-12-13 RX ORDER — BRIMONIDINE TARTRATE 2 MG/ML
1 SOLUTION/ DROPS OPHTHALMIC DAILY
Status: DISCONTINUED | OUTPATIENT
Start: 2024-12-13 | End: 2024-12-15 | Stop reason: HOSPADM

## 2024-12-13 RX ORDER — SODIUM CHLORIDE 0.9 % (FLUSH) 0.9 %
10 SYRINGE (ML) INJECTION AS NEEDED
Status: DISCONTINUED | OUTPATIENT
Start: 2024-12-13 | End: 2024-12-15 | Stop reason: HOSPADM

## 2024-12-13 RX ORDER — CYCLOSPORINE 0.5 MG/ML
1 EMULSION OPHTHALMIC 2 TIMES DAILY
Status: DISCONTINUED | OUTPATIENT
Start: 2024-12-13 | End: 2024-12-15 | Stop reason: HOSPADM

## 2024-12-13 RX ORDER — NITROGLYCERIN 0.4 MG/1
0.4 TABLET SUBLINGUAL
Status: DISCONTINUED | OUTPATIENT
Start: 2024-12-13 | End: 2024-12-15 | Stop reason: HOSPADM

## 2024-12-13 RX ORDER — SODIUM CHLORIDE 9 MG/ML
40 INJECTION, SOLUTION INTRAVENOUS AS NEEDED
Status: DISCONTINUED | OUTPATIENT
Start: 2024-12-13 | End: 2024-12-15 | Stop reason: HOSPADM

## 2024-12-13 RX ORDER — CETIRIZINE HYDROCHLORIDE 10 MG/1
10 TABLET ORAL DAILY
Status: DISCONTINUED | OUTPATIENT
Start: 2024-12-13 | End: 2024-12-15 | Stop reason: HOSPADM

## 2024-12-13 RX ORDER — NICOTINE 21 MG/24HR
1 PATCH, TRANSDERMAL 24 HOURS TRANSDERMAL
Status: DISCONTINUED | OUTPATIENT
Start: 2024-12-13 | End: 2024-12-15 | Stop reason: HOSPADM

## 2024-12-13 RX ORDER — SODIUM CHLORIDE 9 MG/ML
100 INJECTION, SOLUTION INTRAVENOUS CONTINUOUS
Status: ACTIVE | OUTPATIENT
Start: 2024-12-13 | End: 2024-12-13

## 2024-12-13 RX ADMIN — BRIMONIDINE TARTRATE 1 DROP: 2 SOLUTION/ DROPS OPHTHALMIC at 08:39

## 2024-12-13 RX ADMIN — FLUOXETINE HYDROCHLORIDE 20 MG: 20 CAPSULE ORAL at 08:39

## 2024-12-13 NOTE — H&P
"    [facility] Medicine Services  HISTORY AND PHYSICAL    Patient Name: Emma Harper  : 1950  MRN: [id number]  Primary Care Physician: Mansoor Barragan PA  Date of admission: 2024      Subjective   Subjective     Chief Complaint:   N/v/d; transfer from [facility]    HPI:  Emma Harper is a 74 y.o. female  with hx of ??biopsy proven Autoimmune Hepatitis (recent [facility] documentation that states pathology reviewed slides and ??not clear if AI hepatitis but plan to continue immunosuppression as LFT's did improve with this) who presents now as a transfer from [facility] for \"sepsis.\"  Pt on imuran which she stopped a few weeks ago and then restarted today but then began to have n/v/d.  Pt was subsequently taken to OSH ER.  Pt states she was doing well yesterday and then took first dose of imuran since stopping in previously and an hour or so later she began having uncontrollable vomiting and diarrhea simultaneously.  No blood.  No f/c.  No urinary sx.  No abd pain.  No chest pain or shortness of breath or cough.  No leg swelling. Feels much better after ivf's given in ER at OSH.      Pt is followed by Dr. Rios and was seen in her clinic 2024 with plans to refer to [facility]ology in event pt needed transplant.  Plan at that time was to wean off steroid and start azathioprine.      Personal History     Past Medical History:   Diagnosis Date    Elevated liver enzymes     High cholesterol     Hypertension            Past Surgical History:   Procedure Laterality Date    APPENDECTOMY      COLONOSCOPY      TUBAL ABDOMINAL LIGATION         Family History: family history includes Colon cancer in her brother; Colon polyps in her brother.     Social History:  reports that she has been smoking cigarettes. She does not have any smokeless tobacco history on file. Drug use questions deferred to the physician. She reports that she does not drink alcohol.  Social History     Social History Narrative " "   Not on file       Medications:  Available home medication information reviewed.  FLUoxetine, atorvastatin, azaTHIOprine, brimonidine, cetirizine, cycloSPORINE, lisinopril, metoprolol tartrate, nicotine, and predniSONE    Allergies   Allergen Reactions    Penicillins Rash       Objective   Objective     Vital Signs:   Temp:  [99.1 °F (37.3 °C)] 99.1 °F (37.3 °C)  Heart Rate:  [104] 104  Resp:  [16] 16  BP: (112)/(49) 112/49       Physical Exam    Gen; alert, oriented, nad  Heent; pasty mm, perrla, eomi  Cv; rr w/ tachycardia, no mrg  L; ctab, no wheeze/crackles  Abd; soft, +bs,ntnd,no r/g  Ext; no cce, palpable pulses  Skin; cdi, warm  Neuro; grossly intact  Psych; mood and affect appropriate    Result Review:  I have personally reviewed the results from the time of this admission to 12/13/2024 00:13 EST and agree with these findings:  [x]  Laboratory list / accordion  [x]  Microbiology  [x]  Radiology  [x]  EKG/Telemetry   []  Cardiology/Vascular   []  Pathology  []  Old records  []  Other:  Most notable findings include:        LAB RESULTS:        Wbc 22  Hgb 17.7  Lactic acid 2.0  Ast 236  Alt 282  Alk phos 240   Total protein 10.3  Ua negative                           Microbiology Results (last 10 days)       ** No results found for the last 240 hours. **            No radiology results from the last 24 hrs        Assessment & Plan   Assessment & Plan       Diarrhea    Hypotension    Vomiting    Leukocytosis    Autoimmune hepatitis    75 y/o female with ??hx of autoimmune hepatitis (pathology under review) who resumed her imuran today and developed n/v/d;  N/V/D  -timeline occurred as soon as pt resumed imuran  -supportive care with ivf's, anti-emetics   -sx have currently resolved  2. Leukocytosis/Hypotension  -pt transferred here for \"sepsis\" however suspect this is all related to resuming imuran  -repeat labs now  -ivf's  -hold off on further abx   3. ?autoimmune hepatitis  -unclear pathology reports per " epic documentation  -consult to GI in a.m. for further direction on continuing imuran as pathology reports are not clear  and pt does not appear to be tolerant              VTE Prophylaxis:  Mechanical VTE prophylaxis orders are present.          CODE STATUS:    Code Status and Medical Interventions: CPR (Attempt to Resuscitate); Full Support   Ordered at: 12/13/24 0004     Code Status (Patient has no pulse and is not breathing):    CPR (Attempt to Resuscitate)     Medical Interventions (Patient has pulse or is breathing):    Full Support       Expected Discharge   Expected discharge date/ time has not been documented.     Maryan Boyce MD  12/13/24  Electronically signed by Maryan Boyce MD, 12/13/24, 12:20 AM EST.

## 2024-12-13 NOTE — PROGRESS NOTES
[facility] Medicine Services  PROGRESS NOTE    Patient Name: Emma Harper  : 1950  MRN: [id number]    Date of Admission: 2024  Primary Care Physician: Mansoor Barragan PA    Subjective   Subjective     CC:  Nausea and vomiting    HPI:  No nausea or abdominal discomfort this morning.      Objective   Objective     Vital Signs:   Temp:  [98.9 °F (37.2 °C)-99.1 °F (37.3 °C)] 98.9 °F (37.2 °C)  Heart Rate:  [] 94  Resp:  [16-18] 18  BP: (110-112)/(40-55) 110/55     Physical Exam:  Non toxic, in bed  MM moist  RRR  CTAB  Abdomen soft  Awake, speech clear  Normal affect    Family at bedside    Results Reviewed:  LAB RESULTS:      Lab 24  011   WBC 10.80   HEMOGLOBIN 14.3   HEMATOCRIT 44.5   PLATELETS 180   NEUTROS ABS 8.54*   IMMATURE GRANS (ABS) 0.07*   LYMPHS ABS 1.39   MONOS ABS 0.58   EOS ABS 0.14   MCV 90.1   PROCALCITONIN 0.67*   LACTATE 1.0   PROTIME 15.1*         Lab 24  0110   SODIUM 139   POTASSIUM 3.5   CHLORIDE 107   CO2 22.0   ANION GAP 10.0   BUN 12   CREATININE 0.63   EGFR 93.8   GLUCOSE 107*   CALCIUM 8.5*   MAGNESIUM 1.6   PHOSPHORUS 3.4         Lab 24  0110   TOTAL PROTEIN 7.2   ALBUMIN 3.4*   GLOBULIN 3.8   ALT (SGPT) 150*   AST (SGOT) 150*   BILIRUBIN 0.7   ALK PHOS 159*   LIPASE 28         Lab 24  011   PROTIME 15.1*   INR 1.18*                 Brief Urine Lab Results  (Last result in the past 365 days)        Color   Clarity   Blood   Leuk Est   Nitrite   Protein   CREAT   Urine HCG        24 0456 Yellow   Clear   Negative   Negative   Negative   Negative                   Microbiology Results Abnormal       None            No radiology results from the last 24 hrs        Current medications:  Scheduled Meds:brimonidine, 1 drop, Both Eyes, Daily  cetirizine, 10 mg, Oral, Daily  cycloSPORINE, 1 drop, Both Eyes, BID  FLUoxetine, 20 mg, Oral, Daily  nicotine, 1 patch, Transdermal, Q24H  sodium chloride, 10 mL, Intravenous,  Q12H      Continuous Infusions:   PRN Meds:.  Calcium Replacement - Follow Nurse / BPA Driven Protocol    Magnesium Standard Dose Replacement - Follow Nurse / BPA Driven Protocol    nitroglycerin    ondansetron ODT    Phosphorus Replacement - Follow Nurse / BPA Driven Protocol    Potassium Replacement - Follow Nurse / BPA Driven Protocol    sodium chloride    sodium chloride    Assessment & Plan   Assessment & Plan     Active Hospital Problems    Diagnosis  POA    **Diarrhea [R19.7]  Yes    Hypotension [I95.9]  Yes    Vomiting [R11.10]  Yes    Leukocytosis [D72.829]  Yes    Autoimmune hepatitis [K75.4]  Yes      Resolved Hospital Problems   No resolved problems to display.        Brief Hospital Course to date:  Emma Harper is a 74 y.o. female with possible autoimmune hepatitis, HTN, HLD presents with N/V after resuming imuran.  Patient had been off her imuran for a couple of weeks due to a GI illness (several other family members had similar symptoms).    Nausea, vomiting and diarrhea  Autoimmune hepatitis  - GI follows, obtaining GI panel PCR and C diff -- if stool studies negative consider starting imuran while inpatient.  - cmp am    Leukocytosis  - improved, cbc am    HTN  - currently holding home metoprolol/lisinopril, BP acceptable    HLD  - statin held due to elevated LFTs    Expected Discharge Location and Transportation: home  Expected Discharge   Expected discharge date/ time has not been documented.     VTE Prophylaxis:  Mechanical VTE prophylaxis orders are present.         AM-PAC 6 Clicks Score (PT): 24 (12/13/24 0800)    CODE STATUS:   Code Status and Medical Interventions: CPR (Attempt to Resuscitate); Full Support   Ordered at: 12/13/24 0004     Code Status (Patient has no pulse and is not breathing):    CPR (Attempt to Resuscitate)     Medical Interventions (Patient has pulse or is breathing):    Full Support       James Carranza MD  12/13/24

## 2024-12-13 NOTE — CONSULTS
[facility]    Referring Provider: James Carranza MD    PCP: Mansoor Barragan PA    Reason for Consultation: Nausea, vomiting and diarrhea     Chief complaint: Nausea, vomiting and diarrhea     History of present illness:    Emma Harper is a 74 y.o. female who is admitted with a one day history of nausea, vomiting and diarrhea.   She was recently diagnosed with autoimmune hepatitis.    She had a liver biopsy for elevated LFTs on 8/16/2024.    Pathology showed chronic portal hepatitis with plasma cells and at least some degree of interface activity suggestive of autoimmune hepatitis.  She had a positive TIP, elevated smooth muscle Ab of 91, and elevated IgG 2,783.    She was started on Prednisone and Imuran.    The prednisone was tapered off as her liver numbers improved.    She has been followed by Dr. Rios in our outpatient office as well as  hepatology, Dr. Shawn Hubbard.       She states her daughter and grandson had a viral stomach infection approximately one month ago.   She had similar symptoms at that time that lasted 10 days.    She discontinued the Imuran during that illness.   She had outpatient labs earlier this week and resumed the Imuran yesterday.    She developed severe nausea followed by multiple episodes of emesis and profuse diarrhea.    She continues to have diarrhea today.   She denies abdominal pain.        Allergies:  Penicillins    Scheduled Meds:  brimonidine, 1 drop, Both Eyes, Daily  cetirizine, 10 mg, Oral, Daily  cycloSPORINE, 1 drop, Both Eyes, BID  FLUoxetine, 20 mg, Oral, Daily  nicotine, 1 patch, Transdermal, Q24H  sodium chloride, 10 mL, Intravenous, Q12H         Infusions:  sodium chloride, 100 mL/hr        PRN Meds:    Calcium Replacement - Follow Nurse / BPA Driven Protocol    Magnesium Standard Dose Replacement - Follow Nurse / BPA Driven Protocol    nitroglycerin    ondansetron ODT    Phosphorus Replacement - Follow Nurse / BPA Driven Protocol     Potassium Replacement - Follow Nurse / BPA Driven Protocol    sodium chloride    sodium chloride    Home Meds:  Medications Prior to Admission   Medication Sig Dispense Refill Last Dose/Taking    atorvastatin (LIPITOR) 40 MG tablet Take 1 tablet by mouth Daily.       azaTHIOprine (IMURAN) 50 MG tablet Take 1 tablet by mouth Daily. 90 tablet 1     brimonidine (ALPHAGAN) 0.2 % ophthalmic solution INSTILL 1 DROP INTO BOTH EYES EVERY DAY AS NEEDED       cetirizine (zyrTEC) 10 MG tablet Take 1 tablet by mouth Daily.       FLUoxetine (PROzac) 20 MG capsule take 1 capsule by mouth every day for 90 days       lisinopril (PRINIVIL,ZESTRIL) 10 MG tablet take 1 tablet by mouth every day for 90 days       metoprolol tartrate (LOPRESSOR) 25 MG tablet Take 1 tablet by mouth 2 (Two) Times a Day With Meals.       nicotine (Nicoderm CQ) 21 MG/24HR patch Daily.       predniSONE (DELTASONE) 5 MG tablet Take 1 tablet by mouth Daily. Please see attached for detailed directions 14 tablet 0     Restasis 0.05 % ophthalmic emulsion           ROS: Review of Systems   Constitutional:  Positive for fatigue.   Respiratory: Negative.     Cardiovascular: Negative.    Gastrointestinal:  Positive for diarrhea, nausea and vomiting. Negative for abdominal pain.   Musculoskeletal: Negative.    Skin: Negative.        PAST MED HX:  Past Medical History:   Diagnosis Date    Elevated liver enzymes     High cholesterol     Hypertension        PAST SURG HX:  Past Surgical History:   Procedure Laterality Date    APPENDECTOMY      COLONOSCOPY      TUBAL ABDOMINAL LIGATION         FAM HX:  Family History   Problem Relation Age of Onset    Colon polyps Brother     Colon cancer Brother     Esophageal cancer Neg Hx        SOC HX:  Social History     Socioeconomic History    Marital status:    Tobacco Use    Smoking status: Every Day     Current packs/day: 0.25     Types: Cigarettes   Vaping Use    Vaping status: Never Used   Substance and Sexual Activity  "   Alcohol use: Never    Drug use: Defer    Sexual activity: Defer       PHYSICAL EXAM  /40 (BP Location: Left arm, Patient Position: Lying)   Pulse 79   Temp 98.9 °F (37.2 °C) (Oral)   Resp 18   Ht 149.9 cm (59\")   Wt 60.4 kg (133 lb 1.6 oz)   SpO2 95%   BMI 26.88 kg/m²   Wt Readings from Last 3 Encounters:   12/13/24 60.4 kg (133 lb 1.6 oz)   11/12/24 61.1 kg (134 lb 9.6 oz)   08/27/24 57.2 kg (126 lb)   ,body mass index is 26.88 kg/m².  Physical Exam  Constitutional:       General: She is not in acute distress.  Cardiovascular:      Rate and Rhythm: Normal rate and regular rhythm.   Pulmonary:      Effort: Pulmonary effort is normal. No respiratory distress.   Abdominal:      General: Bowel sounds are normal. There is no distension.      Palpations: Abdomen is soft.      Tenderness: There is no abdominal tenderness.   Skin:     General: Skin is warm and dry.   Neurological:      Mental Status: She is alert and oriented to person, place, and time.   Psychiatric:         Behavior: Behavior normal.         Results Review:   I reviewed the patient's new clinical results.    Lab Results   Component Value Date    WBC 10.80 12/13/2024    HGB 14.3 12/13/2024    HGB 16.2 (H) 08/27/2024    HGB 16.0 (H) 08/16/2024    HCT 44.5 12/13/2024    MCV 90.1 12/13/2024     12/13/2024     Lab Results   Component Value Date    INR 1.18 (H) 12/13/2024    INR 1.04 08/27/2024    INR 1.04 08/16/2024     Lab Results   Component Value Date    GLUCOSE 107 (H) 12/13/2024    BUN 12 12/13/2024    CREATININE 0.63 12/13/2024    BCR 19.0 12/13/2024     12/13/2024    K 3.5 12/13/2024    CO2 22.0 12/13/2024    CALCIUM 8.5 (L) 12/13/2024    ALBUMIN 3.4 (L) 12/13/2024    ALKPHOS 159 (H) 12/13/2024    BILITOT 0.7 12/13/2024     (H) 12/13/2024     (H) 12/13/2024     ASSESSMENTS/PLANS    Acute diarrhea  Acute nausea and vomiting   Autoimmune hepatitis     >> Will exclude infectious source of acute symptoms.     " Obtain GI panel PCR  and C. Difficile toxin.      >> Will check a lipase   >> If stool studies are negative will consider restarting Imuran tomorrow inpatient     I discussed the patient's findings and my recommendations with patient    PASTORA Doss  12/13/24  10:38 EST

## 2024-12-13 NOTE — PLAN OF CARE
Problem: Diarrhea  Goal: Effective Diarrhea Management  Outcome: Progressing  Intervention: Manage Diarrhea  Description: Provide fluid and electrolyte replacement to correct any imbalance through use of oral rehydration solution, nasogastric tube or intravenous fluid therapy.  Utilize skin protectant barrier to maintain skin integrity; cleanse gently, thoroughly and promptly after stooling; avoid alcohol-containing wipes.  Continue usual diet; encourage fluids (e.g., broth, soups, fruit juices).  Keep toilet, toileting devices and aids readily accessible and barrier-free to maintain safety.  Provide comfort measures and privacy.  Evaluate need for fecal-containment device to minimize skin exposure.  Implement contact precautions, if infection suspected.  Anticipate pharmacologic therapy, such as antidiarrheal, antiemetic, probiotic or antimicrobial agent, to decrease output.  If diarrhea persists, advocate for identification of underlying cause.  Flowsheets (Taken 12/13/2024 1143)  Fluid/Electrolyte Management: fluids provided  Isolation Precautions:   spore   precautions initiated   Goal Outcome Evaluation:

## 2024-12-13 NOTE — CASE MANAGEMENT/SOCIAL WORK
Discharge Planning Assessment  [facility]     Patient Name: Emma Harper  MRN: [id number]  Today's Date: 12/13/2024    Admit Date: 12/12/2024    Plan: home   Discharge Needs Assessment       Row Name 12/13/24 1425       Living Environment    People in Home child(sarath), adult    Current Living Arrangements home    Potentially Unsafe Housing Conditions none    In the past 12 months has the electric, gas, oil, or water company threatened to shut off services in your home? No    Primary Care Provided by self    Provides Primary Care For no one    Family Caregiver if Needed child(sarath), adult    Family Caregiver Names GILLIAN HERRERA Daughter   [phone]    Quality of Family Relationships helpful;involved;supportive    Able to Return to Prior Arrangements yes       Resource/Environmental Concerns    Resource/Environmental Concerns none    Transportation Concerns none       Transportation Needs    In the past 12 months, has lack of transportation kept you from medical appointments or from getting medications? no    In the past 12 months, has lack of transportation kept you from meetings, work, or from getting things needed for daily living? No       Food Insecurity    Within the past 12 months, you worried that your food would run out before you got the money to buy more. Never true    Within the past 12 months, the food you bought just didn't last and you didn't have money to get more. Never true       Transition Planning    Patient/Family Anticipates Transition to home with family    Patient/Family Anticipated Services at Transition none    Transportation Anticipated family or friend will provide       Discharge Needs Assessment    Readmission Within the Last 30 Days no previous admission in last 30 days    Equipment Currently Used at Home none    Concerns to be Addressed no discharge needs identified;discharge planning    Do you want help finding or keeping work or a job? I do not need or want help    Do you want help  with school or training? For example, starting or completing job training or getting a high school diploma, GED or equivalent No    Anticipated Changes Related to Illness none    Equipment Needed After Discharge none    Provided Post Acute Provider List? N/A    Provided Post Acute Provider Quality & Resource List? N/A                   Discharge Plan       Row Name 12/13/24 1426       Plan    Plan home    Patient/Family in Agreement with Plan yes    Plan Comments Met with Ms. Harper at the bedside to initiate discharge plan. She lives with her daughter in [facility]. She is independent with ADLs and does not use any DME. She denies the receipt of home health or outpatient services. Her primary care provider is PASTORA Stroud. She denies obstacles with getting medical care or obtaining medications. No discharge needs were identified today. Her plan is home with her daughter, and daughter will transport.  will continue to follow plan of care and assist with discharge planning needs as indicated.    Final Discharge Disposition Code 01 - home or self-care                  Continued Care and Services - Admitted Since 12/12/2024    No active coordination exists for this encounter.       Expected Discharge Date and Time       Expected Discharge Date Expected Discharge Time    Dec 16, 2024            Demographic Summary       Row Name 12/13/24 1423       General Information    Admission Type observation    Arrived From hospital    Referral Source admission list    Reason for Consult discharge planning    Preferred Language English       Contact Information    Permission Granted to Share Info With family/designee    Contact Information Obtained for     Contact Information Comments GILLIAN HERRERA Daughter   [phone]                   Functional Status       Row Name 12/13/24 1424       Functional Status    Usual Activity Tolerance good    Current Activity Tolerance good       Physical Activity    On  average, how many days per week do you engage in moderate to strenuous exercise (like a brisk walk)? 0 days    On average, how many minutes do you engage in exercise at this level? 0 min    Number of minutes of exercise per week 0       Assessment of Health Literacy    How often do you have someone help you read hospital materials? Never    How often do you have problems learning about your medical condition because of difficulty understanding written information? Never    How often do you have a problem understanding what is told to you about your medical condition? Never    How confident are you filling out medical forms by yourself? Quite a bit    Health Literacy Good       Functional Status, IADL    Medications independent    Meal Preparation independent    Housekeeping independent    Laundry independent    Shopping independent    If for any reason you need help with day-to-day activities such as bathing, preparing meals, shopping, managing finances, etc., do you get the help you need? I get all the help I need       Mental Status    General Appearance WDL WDL       Mental Status Summary    Recent Changes in Mental Status/Cognitive Functioning no changes                   Psychosocial    No documentation.                  Abuse/Neglect    No documentation.                  Legal    No documentation.                  Substance Abuse    No documentation.                  Patient Forms    No documentation.                     Cherie Winston RN     no fever and no chills.

## 2024-12-14 LAB
ADV 40+41 DNA STL QL NAA+NON-PROBE: NOT DETECTED
ALBUMIN SERPL-MCNC: 3.2 G/DL (ref 3.5–5.2)
ALBUMIN/GLOB SERPL: 0.8 G/DL
ALP SERPL-CCNC: 180 U/L (ref 39–117)
ALT SERPL W P-5'-P-CCNC: 158 U/L (ref 1–33)
ANION GAP SERPL CALCULATED.3IONS-SCNC: 9 MMOL/L (ref 5–15)
AST SERPL-CCNC: 183 U/L (ref 1–32)
ASTRO TYP 1-8 RNA STL QL NAA+NON-PROBE: NOT DETECTED
BILIRUB SERPL-MCNC: 0.6 MG/DL (ref 0–1.2)
BUN SERPL-MCNC: 9 MG/DL (ref 8–23)
BUN/CREAT SERPL: 17.3 (ref 7–25)
C CAYETANENSIS DNA STL QL NAA+NON-PROBE: NOT DETECTED
C COLI+JEJ+UPSA DNA STL QL NAA+NON-PROBE: NOT DETECTED
CALCIUM SPEC-SCNC: 8.5 MG/DL (ref 8.6–10.5)
CHLORIDE SERPL-SCNC: 107 MMOL/L (ref 98–107)
CO2 SERPL-SCNC: 23 MMOL/L (ref 22–29)
CREAT SERPL-MCNC: 0.52 MG/DL (ref 0.57–1)
CRYPTOSP DNA STL QL NAA+NON-PROBE: NOT DETECTED
DEPRECATED RDW RBC AUTO: 46.9 FL (ref 37–54)
E HISTOLYT DNA STL QL NAA+NON-PROBE: NOT DETECTED
EAEC PAA PLAS AGGR+AATA ST NAA+NON-PRB: NOT DETECTED
EC STX1+STX2 GENES STL QL NAA+NON-PROBE: NOT DETECTED
EGFRCR SERPLBLD CKD-EPI 2021: 98.2 ML/MIN/1.73
EPEC EAE GENE STL QL NAA+NON-PROBE: NOT DETECTED
ERYTHROCYTE [DISTWIDTH] IN BLOOD BY AUTOMATED COUNT: 14.4 % (ref 12.3–15.4)
ETEC LTA+ST1A+ST1B TOX ST NAA+NON-PROBE: NOT DETECTED
G LAMBLIA DNA STL QL NAA+NON-PROBE: NOT DETECTED
GLOBULIN UR ELPH-MCNC: 3.8 GM/DL
GLUCOSE SERPL-MCNC: 85 MG/DL (ref 65–99)
HCT VFR BLD AUTO: 42.1 % (ref 34–46.6)
HGB BLD-MCNC: 13.9 G/DL (ref 12–15.9)
MCH RBC QN AUTO: 29.4 PG (ref 26.6–33)
MCHC RBC AUTO-ENTMCNC: 33 G/DL (ref 31.5–35.7)
MCV RBC AUTO: 89 FL (ref 79–97)
NOROVIRUS GI+II RNA STL QL NAA+NON-PROBE: NOT DETECTED
P SHIGELLOIDES DNA STL QL NAA+NON-PROBE: NOT DETECTED
PLATELET # BLD AUTO: 181 10*3/MM3 (ref 140–450)
PMV BLD AUTO: 10 FL (ref 6–12)
POTASSIUM SERPL-SCNC: 3.7 MMOL/L (ref 3.5–5.2)
PROT SERPL-MCNC: 7 G/DL (ref 6–8.5)
RBC # BLD AUTO: 4.73 10*6/MM3 (ref 3.77–5.28)
RVA RNA STL QL NAA+NON-PROBE: NOT DETECTED
S ENT+BONG DNA STL QL NAA+NON-PROBE: NOT DETECTED
SAPO I+II+IV+V RNA STL QL NAA+NON-PROBE: NOT DETECTED
SHIGELLA SP+EIEC IPAH ST NAA+NON-PROBE: NOT DETECTED
SODIUM SERPL-SCNC: 139 MMOL/L (ref 136–145)
V CHOL+PARA+VUL DNA STL QL NAA+NON-PROBE: NOT DETECTED
V CHOLERAE DNA STL QL NAA+NON-PROBE: NOT DETECTED
WBC NRBC COR # BLD AUTO: 6.97 10*3/MM3 (ref 3.4–10.8)
Y ENTEROCOL DNA STL QL NAA+NON-PROBE: NOT DETECTED

## 2024-12-14 PROCEDURE — A9270 NON-COVERED ITEM OR SERVICE: HCPCS | Performed by: INTERNAL MEDICINE

## 2024-12-14 PROCEDURE — 85027 COMPLETE CBC AUTOMATED: CPT | Performed by: INTERNAL MEDICINE

## 2024-12-14 PROCEDURE — 63710000001 AZATHIOPRINE PER 50 MG: Performed by: INTERNAL MEDICINE

## 2024-12-14 PROCEDURE — 96374 THER/PROPH/DIAG INJ IV PUSH: CPT

## 2024-12-14 PROCEDURE — 25010000002 PROCHLORPERAZINE 10 MG/2ML SOLUTION: Performed by: NURSE PRACTITIONER

## 2024-12-14 PROCEDURE — 63710000001 FLUOXETINE 20 MG CAPSULE: Performed by: INTERNAL MEDICINE

## 2024-12-14 PROCEDURE — G0378 HOSPITAL OBSERVATION PER HR: HCPCS

## 2024-12-14 PROCEDURE — 99213 OFFICE O/P EST LOW 20 MIN: CPT | Performed by: NURSE PRACTITIONER

## 2024-12-14 PROCEDURE — 63710000001 CETIRIZINE 10 MG TABLET: Performed by: INTERNAL MEDICINE

## 2024-12-14 PROCEDURE — 63710000001 ONDANSETRON ODT 4 MG TABLET DISPERSIBLE: Performed by: INTERNAL MEDICINE

## 2024-12-14 PROCEDURE — 63710000001 CYCLOSPORINE 0.05 % EMULSION: Performed by: INTERNAL MEDICINE

## 2024-12-14 PROCEDURE — 80053 COMPREHEN METABOLIC PANEL: CPT | Performed by: INTERNAL MEDICINE

## 2024-12-14 PROCEDURE — 87507 IADNA-DNA/RNA PROBE TQ 12-25: CPT | Performed by: INTERNAL MEDICINE

## 2024-12-14 RX ORDER — AZATHIOPRINE 50 MG/1
50 TABLET ORAL DAILY
Status: DISCONTINUED | OUTPATIENT
Start: 2024-12-14 | End: 2024-12-15

## 2024-12-14 RX ORDER — PROCHLORPERAZINE EDISYLATE 5 MG/ML
2.5 INJECTION INTRAMUSCULAR; INTRAVENOUS ONCE
Status: COMPLETED | OUTPATIENT
Start: 2024-12-14 | End: 2024-12-14

## 2024-12-14 RX ADMIN — CYCLOSPORINE 1 DROP: 0.5 EMULSION OPHTHALMIC at 20:45

## 2024-12-14 RX ADMIN — CYCLOSPORINE 1 DROP: 0.5 EMULSION OPHTHALMIC at 08:15

## 2024-12-14 RX ADMIN — BRIMONIDINE TARTRATE 1 DROP: 2 SOLUTION/ DROPS OPHTHALMIC at 08:16

## 2024-12-14 RX ADMIN — CETIRIZINE HYDROCHLORIDE 10 MG: 10 TABLET, FILM COATED ORAL at 08:15

## 2024-12-14 RX ADMIN — PROCHLORPERAZINE EDISYLATE 2.5 MG: 5 INJECTION INTRAMUSCULAR; INTRAVENOUS at 22:06

## 2024-12-14 RX ADMIN — AZATHIOPRINE 50 MG: 50 TABLET ORAL at 20:49

## 2024-12-14 RX ADMIN — ONDANSETRON 4 MG: 4 TABLET, ORALLY DISINTEGRATING ORAL at 21:38

## 2024-12-14 RX ADMIN — FLUOXETINE HYDROCHLORIDE 20 MG: 20 CAPSULE ORAL at 08:15

## 2024-12-14 RX ADMIN — Medication 10 ML: at 20:45

## 2024-12-14 NOTE — PROGRESS NOTES
"  GI Daily Progress Note  Subjective:    Chief Complaint: Follow-up nausea, vomiting, diarrhea    Patient resting in bed no acute distress.  Notes she is feeling better today.  No further diarrhea.  Tolerating diet.  Hesitant to resume Imuran as she attributes her symptoms to this.    Objective:    /55 (BP Location: Left arm, Patient Position: Lying)   Pulse 95   Temp 98.4 °F (36.9 °C) (Oral)   Resp 18   Ht 149.9 cm (59\")   Wt 60.4 kg (133 lb 1.6 oz)   SpO2 95%   BMI 26.88 kg/m²     Physical Exam  Vitals and nursing note reviewed.   Constitutional:       General: She is not in acute distress.     Appearance: Normal appearance. She is normal weight. She is not ill-appearing or toxic-appearing.   Cardiovascular:      Rate and Rhythm: Normal rate and regular rhythm.      Pulses: Normal pulses.      Heart sounds: Normal heart sounds.   Pulmonary:      Effort: Pulmonary effort is normal. No respiratory distress.      Breath sounds: Normal breath sounds.   Abdominal:      General: Abdomen is flat. Bowel sounds are normal. There is no distension.      Palpations: Abdomen is soft. There is no mass.      Tenderness: There is no abdominal tenderness. There is no guarding or rebound.      Hernia: No hernia is present.   Neurological:      General: No focal deficit present.      Mental Status: She is alert and oriented to person, place, and time.         Lab  I have personally reviewed most recent cardiac tracings, lab results, and radiology images and interpretations and agree with findings.    Lab Results   Component Value Date    WBC 6.97 12/14/2024    HGB 13.9 12/14/2024    HGB 14.3 12/13/2024    HGB 16.2 (H) 08/27/2024    MCV 89.0 12/14/2024     12/14/2024    INR 1.18 (H) 12/13/2024    INR 1.04 08/27/2024    INR 1.04 08/16/2024       Lab Results   Component Value Date    GLUCOSE 85 12/14/2024    BUN 9 12/14/2024    CREATININE 0.52 (L) 12/14/2024    BCR 17.3 12/14/2024     12/14/2024    K 3.7 " 12/14/2024    CO2 23.0 12/14/2024    CALCIUM 8.5 (L) 12/14/2024    ALBUMIN 3.2 (L) 12/14/2024    ALKPHOS 180 (H) 12/14/2024    BILITOT 0.6 12/14/2024     (H) 12/14/2024     (H) 12/14/2024     Assessment:    Diarrhea    Hypotension    Vomiting    Leukocytosis    Autoimmune hepatitis    Acute diarrhea  Acute nausea and vomiting   Autoimmune hepatitis     Plan:  Patient doing well today.  Infectious studies negative and lipase WNL.  Suspect patient recovering from acute gastroenteritis as several members of her family been ill for the last few weeks.  Do not suspect that Imuran is culprit for her acute diarrheal illness; after lengthy conversation with patient and family, will plan to resume Imuran tonight and evaluate response.  If she tolerates tonight and a.m. dose, okay for discharge tomorrow with close outpatient follow-up with gastroenterology.    >> Infectious etiologies negative; lipase WNL  >> Resume Imuran 50 mg p.o. daily  >> Patient to follow-up with Dr. Rios in the outpatient GI clinic; will ultimately need to follow-up with GI hepatology as well for further evaluation and management of autoimmune hepatitis.    If tolerating Imuran, anticipate discharge home in AM.    DEBORAH Salomon  12/14/24  18:11 EST

## 2024-12-14 NOTE — PROGRESS NOTES
[facility] Medicine Services  PROGRESS NOTE    Patient Name: Emma Harper  : 1950  MRN: [id number]    Date of Admission: 2024  Primary Care Physician: Mansoor Barragan PA    Subjective   Subjective     CC:  Nausea and vomiting    HPI:  No more GI symptoms, denies nausea or vomiting.       Objective   Objective     Vital Signs:   Temp:  [97.3 °F (36.3 °C)-98.4 °F (36.9 °C)] 98.4 °F (36.9 °C)  Heart Rate:  [95-96] 95  Resp:  [18-20] 18  BP: (113-128)/(55-58) 113/55     Physical Exam:  Non toxic, sitting up on the edge of the bed  MM moist  RRR  CTAB  Abdomen soft  Alert, speech clear  Normal affect    Results Reviewed:  LAB RESULTS:      Lab 24  0518 24  0110   WBC 6.97 10.80   HEMOGLOBIN 13.9 14.3   HEMATOCRIT 42.1 44.5   PLATELETS 181 180   NEUTROS ABS  --  8.54*   IMMATURE GRANS (ABS)  --  0.07*   LYMPHS ABS  --  1.39   MONOS ABS  --  0.58   EOS ABS  --  0.14   MCV 89.0 90.1   PROCALCITONIN  --  0.67*   LACTATE  --  1.0   PROTIME  --  15.1*         Lab 24  0518 24  0110   SODIUM 139 139   POTASSIUM 3.7 3.5   CHLORIDE 107 107   CO2 23.0 22.0   ANION GAP 9.0 10.0   BUN 9 12   CREATININE 0.52* 0.63   EGFR 98.2 93.8   GLUCOSE 85 107*   CALCIUM 8.5* 8.5*   MAGNESIUM  --  1.6   PHOSPHORUS  --  3.4         Lab 24  0518 24  0110   TOTAL PROTEIN 7.0 7.2   ALBUMIN 3.2* 3.4*   GLOBULIN 3.8 3.8   ALT (SGPT) 158* 150*   AST (SGOT) 183* 150*   BILIRUBIN 0.6 0.7   ALK PHOS 180* 159*   LIPASE  --  28         Lab 24  0110   PROTIME 15.1*   INR 1.18*                 Brief Urine Lab Results  (Last result in the past 365 days)        Color   Clarity   Blood   Leuk Est   Nitrite   Protein   CREAT   Urine HCG        24 0456 Yellow   Clear   Negative   Negative   Negative   Negative                   Microbiology Results Abnormal       None            No radiology results from the last 24 hrs        Current medications:  Scheduled Meds:brimonidine,  1 drop, Both Eyes, Daily  cetirizine, 10 mg, Oral, Daily  cycloSPORINE, 1 drop, Both Eyes, BID  FLUoxetine, 20 mg, Oral, Daily  nicotine, 1 patch, Transdermal, Q24H  sodium chloride, 10 mL, Intravenous, Q12H      Continuous Infusions:   PRN Meds:.  Calcium Replacement - Follow Nurse / BPA Driven Protocol    Magnesium Standard Dose Replacement - Follow Nurse / BPA Driven Protocol    nitroglycerin    ondansetron ODT    Phosphorus Replacement - Follow Nurse / BPA Driven Protocol    Potassium Replacement - Follow Nurse / BPA Driven Protocol    sodium chloride    sodium chloride    Assessment & Plan   Assessment & Plan     Active Hospital Problems    Diagnosis  POA    **Diarrhea [R19.7]  Yes    Hypotension [I95.9]  Yes    Vomiting [R11.10]  Yes    Leukocytosis [D72.829]  Yes    Autoimmune hepatitis [K75.4]  Yes      Resolved Hospital Problems   No resolved problems to display.        Brief Hospital Course to date:  Emma Harper is a 74 y.o. female with possible autoimmune hepatitis, HTN, HLD presents with N/V after resuming imuran.  Patient had been off her imuran for a couple of weeks due to a GI illness (several other family members had similar symptoms).    Nausea, vomiting and diarrhea  Autoimmune hepatitis  - GI panel PCR and C diff ordered but no further loose stool  - tentative plans to re-challenge Imuran today -- discussed with patient but she is reluctant to resume this medication.  Says she would prefer to wait until her followup with GI in February to discuss treatment options.  Asked Dr Hopper to review implications of this approach with Ms Harper before she makes a final decision.  - cmp am if still hospitalized    Leukocytosis  - resolved    HTN  - currently holding home metoprolol/lisinopril, BP acceptable    HLD  - statin held due to elevated LFTs    Expected Discharge Location and Transportation: home  Expected Discharge   Expected Discharge Date: 12/16/2024; Expected Discharge Time:      VTE  Prophylaxis:  Mechanical VTE prophylaxis orders are present.         AM-PAC 6 Clicks Score (PT): 24 (12/13/24 1958)    CODE STATUS:   Code Status and Medical Interventions: CPR (Attempt to Resuscitate); Full Support   Ordered at: 12/13/24 0004     Code Status (Patient has no pulse and is not breathing):    CPR (Attempt to Resuscitate)     Medical Interventions (Patient has pulse or is breathing):    Full Support       James Carranza MD  12/14/24

## 2024-12-14 NOTE — PLAN OF CARE
Problem: Adult Inpatient Plan of Care  Goal: Absence of Hospital-Acquired Illness or Injury  Intervention: Identify and Manage Fall Risk  Recent Flowsheet Documentation  Taken 12/14/2024 0813 by Sabas Paulson, RN  Safety Promotion/Fall Prevention: clutter free environment maintained     Problem: Adult Inpatient Plan of Care  Goal: Absence of Hospital-Acquired Illness or Injury  Intervention: Prevent Skin Injury  Recent Flowsheet Documentation  Taken 12/14/2024 0813 by Sabas Paulson, RN  Skin Protection: pulse oximeter probe site changed     Problem: Diarrhea  Goal: Effective Diarrhea Management  Intervention: Manage Diarrhea  Recent Flowsheet Documentation  Taken 12/14/2024 0813 by Sabas Paulson, RN  Medication Review/Management: medications reviewed   Goal Outcome Evaluation:

## 2024-12-15 VITALS
OXYGEN SATURATION: 93 % | SYSTOLIC BLOOD PRESSURE: 114 MMHG | HEART RATE: 95 BPM | DIASTOLIC BLOOD PRESSURE: 54 MMHG | HEIGHT: 59 IN | RESPIRATION RATE: 14 BRPM | BODY MASS INDEX: 26.83 KG/M2 | TEMPERATURE: 96.5 F | WEIGHT: 133.1 LBS

## 2024-12-15 LAB
ALBUMIN SERPL-MCNC: 3.5 G/DL (ref 3.5–5.2)
ALBUMIN/GLOB SERPL: 0.9 G/DL
ALP SERPL-CCNC: 178 U/L (ref 39–117)
ALT SERPL W P-5'-P-CCNC: 149 U/L (ref 1–33)
ANION GAP SERPL CALCULATED.3IONS-SCNC: 8 MMOL/L (ref 5–15)
AST SERPL-CCNC: 161 U/L (ref 1–32)
BILIRUB SERPL-MCNC: 0.7 MG/DL (ref 0–1.2)
BUN SERPL-MCNC: 9 MG/DL (ref 8–23)
BUN/CREAT SERPL: 15.5 (ref 7–25)
CALCIUM SPEC-SCNC: 9 MG/DL (ref 8.6–10.5)
CHLORIDE SERPL-SCNC: 105 MMOL/L (ref 98–107)
CO2 SERPL-SCNC: 27 MMOL/L (ref 22–29)
CREAT SERPL-MCNC: 0.58 MG/DL (ref 0.57–1)
EGFRCR SERPLBLD CKD-EPI 2021: 95.7 ML/MIN/1.73
GLOBULIN UR ELPH-MCNC: 4.1 GM/DL
GLUCOSE SERPL-MCNC: 95 MG/DL (ref 65–99)
POTASSIUM SERPL-SCNC: 3.6 MMOL/L (ref 3.5–5.2)
PROT SERPL-MCNC: 7.6 G/DL (ref 6–8.5)
SODIUM SERPL-SCNC: 140 MMOL/L (ref 136–145)

## 2024-12-15 PROCEDURE — A9270 NON-COVERED ITEM OR SERVICE: HCPCS | Performed by: INTERNAL MEDICINE

## 2024-12-15 PROCEDURE — 63710000001 CETIRIZINE 10 MG TABLET: Performed by: INTERNAL MEDICINE

## 2024-12-15 PROCEDURE — 63710000001 FLUOXETINE 20 MG CAPSULE: Performed by: INTERNAL MEDICINE

## 2024-12-15 PROCEDURE — 63710000001 BUDESONIDE 3 MG CAPSULE DELAYED-RELEASE PARTICLES: Performed by: INTERNAL MEDICINE

## 2024-12-15 PROCEDURE — 99213 OFFICE O/P EST LOW 20 MIN: CPT | Performed by: INTERNAL MEDICINE

## 2024-12-15 PROCEDURE — G0378 HOSPITAL OBSERVATION PER HR: HCPCS

## 2024-12-15 PROCEDURE — 80053 COMPREHEN METABOLIC PANEL: CPT | Performed by: INTERNAL MEDICINE

## 2024-12-15 PROCEDURE — 63710000001 POTASSIUM CHLORIDE 20 MEQ TABLET CONTROLLED-RELEASE: Performed by: INTERNAL MEDICINE

## 2024-12-15 RX ORDER — BUDESONIDE 3 MG/1
9 CAPSULE, COATED PELLETS ORAL DAILY
Status: DISCONTINUED | OUTPATIENT
Start: 2024-12-15 | End: 2024-12-15 | Stop reason: HOSPADM

## 2024-12-15 RX ORDER — POTASSIUM CHLORIDE 1500 MG/1
40 TABLET, EXTENDED RELEASE ORAL EVERY 4 HOURS
Status: DISCONTINUED | OUTPATIENT
Start: 2024-12-15 | End: 2024-12-15 | Stop reason: HOSPADM

## 2024-12-15 RX ORDER — BUDESONIDE 3 MG/1
9 CAPSULE, COATED PELLETS ORAL DAILY
Qty: 90 CAPSULE | Refills: 1 | Status: SHIPPED | OUTPATIENT
Start: 2024-12-15

## 2024-12-15 RX ADMIN — FLUOXETINE HYDROCHLORIDE 20 MG: 20 CAPSULE ORAL at 08:54

## 2024-12-15 RX ADMIN — POTASSIUM CHLORIDE 40 MEQ: 1500 TABLET, EXTENDED RELEASE ORAL at 08:54

## 2024-12-15 RX ADMIN — BRIMONIDINE TARTRATE 1 DROP: 2 SOLUTION/ DROPS OPHTHALMIC at 08:55

## 2024-12-15 RX ADMIN — BUDESONIDE 9 MG: 3 CAPSULE, COATED PELLETS ORAL at 10:15

## 2024-12-15 RX ADMIN — CETIRIZINE HYDROCHLORIDE 10 MG: 10 TABLET, FILM COATED ORAL at 08:54

## 2024-12-15 NOTE — DISCHARGE SUMMARY
[facility] Medicine Services  DISCHARGE SUMMARY    Patient Name: Emma Harper  : 1950  MRN: [id number]    Date of Admission: 2024 10:56 PM  Date of Discharge:  12/15/24  Primary Care Physician: Mansoor Barragan PA    Consults       Date and Time Order Name Status Description    2024 12:04 AM Inpatient Gastroenterology Consult Completed             Hospital Course     Presenting Problem: nausea and vomiting    Active Hospital Problems    Diagnosis  POA    **Diarrhea [R19.7]  Yes    Hypotension [I95.9]  Yes    Vomiting [R11.10]  Yes    Leukocytosis [D72.829]  Yes    Autoimmune hepatitis [K75.4]  Yes      Resolved Hospital Problems   No resolved problems to display.          Hospital Course:  Emma Harper is a 74 y.o. female with possible autoimmune hepatitis, HTN, HLD presents with N/V after resuming imuran.  Patient had been off her imuran for a couple of weeks due to a GI illness (several other family members had similar symptoms).     Nausea, vomiting and diarrhea  Autoimmune hepatitis  - GI panel PCR negative  - Gastroenterology followed  - rechallenged patient with imuran during this hospitalization, but she developed recurrent nausea and vomiting.    >>> Imuran discontinued   >>> Budesonide 9 mg PO daily started  >>> follow up with [facility] at next available appointment  >>> follow up with GI Dr Rios as scheduled     Leukocytosis  - resolved     HTN  - resume home metoprolol at discharge  - held lisinopril at discharge (patient remains normotensive), followup with PCP in one week.     HLD  - statin held due to elevated LFTs      Discharge Follow Up Recommendations for outpatient labs/diagnostics:   Consider CMP at PCP followup    Day of Discharge     HPI:   Developed nausea and vomiting 30 minutes after taking imuran last night.  No current GI symptoms.  Would like to go home today    Review of Systems  Gen: no fevers    Vital Signs:   Temp:  [96.9 °F (36.1  °C)-98.4 °F (36.9 °C)] 97.1 °F (36.2 °C)  Heart Rate:  [78-96] 96  Resp:  [14-18] 14  BP: (113-138)/(50-77) 122/58      Physical Exam:  Non toxic, in bed  MM moist  RRR  CTAB  Abdomen soft, NT  Normal affect  Alert, speech clear    Pertinent  and/or Most Recent Results     LAB RESULTS:      Lab 12/14/24  0518 12/13/24  0110   WBC 6.97 10.80   HEMOGLOBIN 13.9 14.3   HEMATOCRIT 42.1 44.5   PLATELETS 181 180   NEUTROS ABS  --  8.54*   IMMATURE GRANS (ABS)  --  0.07*   LYMPHS ABS  --  1.39   MONOS ABS  --  0.58   EOS ABS  --  0.14   MCV 89.0 90.1   PROCALCITONIN  --  0.67*   LACTATE  --  1.0   PROTIME  --  15.1*         Lab 12/15/24  0345 12/14/24  0518 12/13/24  0110   SODIUM 140 139 139   POTASSIUM 3.6 3.7 3.5   CHLORIDE 105 107 107   CO2 27.0 23.0 22.0   ANION GAP 8.0 9.0 10.0   BUN 9 9 12   CREATININE 0.58 0.52* 0.63   EGFR 95.7 98.2 93.8   GLUCOSE 95 85 107*   CALCIUM 9.0 8.5* 8.5*   MAGNESIUM  --   --  1.6   PHOSPHORUS  --   --  3.4         Lab 12/15/24  0345 12/14/24  0518 12/13/24  0110   TOTAL PROTEIN 7.6 7.0 7.2   ALBUMIN 3.5 3.2* 3.4*   GLOBULIN 4.1 3.8 3.8   ALT (SGPT) 149* 158* 150*   AST (SGOT) 161* 183* 150*   BILIRUBIN 0.7 0.6 0.7   ALK PHOS 178* 180* 159*   LIPASE  --   --  28         Lab 12/13/24  0110   PROTIME 15.1*   INR 1.18*                 Brief Urine Lab Results  (Last result in the past 365 days)        Color   Clarity   Blood   Leuk Est   Nitrite   Protein   CREAT   Urine HCG        12/13/24 0456 Yellow   Clear   Negative   Negative   Negative   Negative                 Microbiology Results (last 10 days)       Procedure Component Value - Date/Time    Gastrointestinal Panel, PCR - Stool, Per Rectum [[id number]]  (Normal) Collected: 12/14/24 1034    Lab Status: Final result Specimen: Stool from Per Rectum Updated: 12/14/24 1208     Campylobacter Not Detected     Plesiomonas shigelloides Not Detected     Salmonella Not Detected     Vibrio Not Detected     Vibrio cholerae Not Detected     Yersinia  enterocolitica Not Detected     Enteroaggregative E. coli (EAEC) Not Detected     Enteropathogenic E. coli (EPEC) Not Detected     Enterotoxigenic E. coli (ETEC) lt/st Not Detected     Shiga-like toxin-producing E. coli (STEC) stx1/stx2 Not Detected     Shigella/Enteroinvasive E. coli (EIEC) Not Detected     Cryptosporidium Not Detected     Cyclospora cayetanensis Not Detected     Entamoeba histolytica Not Detected     Giardia lamblia Not Detected     Adenovirus F40/41 Not Detected     Astrovirus Not Detected     Norovirus GI/GII Not Detected     Rotavirus A Not Detected     Sapovirus (I, II, IV or V) Not Detected    Blood Culture - Blood, Hand, Left [[id number]]  (Normal) Collected: 12/13/24 0110    Lab Status: Preliminary result Specimen: Blood from Hand, Left Updated: 12/15/24 0200     Blood Culture No growth at 2 days    Blood Culture - Blood, Arm, Left [[id number]]  (Normal) Collected: 12/13/24 0110    Lab Status: Preliminary result Specimen: Blood from Arm, Left Updated: 12/15/24 0200     Blood Culture No growth at 2 days            No radiology results for the last 10 days                Plan for Follow-up of Pending Labs/Results:   Pending Labs       Order Current Status    Blood Culture - Blood, Arm, Left Preliminary result    Blood Culture - Blood, Hand, Left Preliminary result          Discharge Details        Discharge Medications        New Medications        Instructions Start Date   Budesonide 3 MG 24 hr capsule  Commonly known as: ENTOCORT EC   9 mg, Oral, Daily             Continue These Medications        Instructions Start Date   brimonidine 0.2 % ophthalmic solution  Commonly known as: ALPHAGAN   INSTILL 1 DROP INTO BOTH EYES EVERY DAY AS NEEDED      cetirizine 10 MG tablet  Commonly known as: zyrTEC   10 mg, Daily      FLUoxetine 20 MG capsule  Commonly known as: PROzac   take 1 capsule by mouth every day for 90 days      metoprolol tartrate 25 MG tablet  Commonly known as: LOPRESSOR   25 mg, 2  Times Daily With Meals      Nicoderm CQ 21 MG/24HR patch  Generic drug: nicotine   Every 24 Hours      Restasis 0.05 % ophthalmic emulsion  Generic drug: cycloSPORINE              Stop These Medications      atorvastatin 40 MG tablet  Commonly known as: LIPITOR     azaTHIOprine 50 MG tablet  Commonly known as: IMURAN     lisinopril 10 MG tablet  Commonly known as: PRINIVIL,ZESTRIL     predniSONE 5 MG tablet  Commonly known as: DELTASONE              Allergies   Allergen Reactions    Penicillins Rash         Discharge Disposition:  Home or Self Care    Diet:  Hospital:  Diet Order   Procedures    Diet: Regular/House; Fluid Consistency: Thin (IDDSI 0)            Activity:      Restrictions or Other Recommendations:         CODE STATUS:    Code Status and Medical Interventions: CPR (Attempt to Resuscitate); Full Support   Ordered at: 12/13/24 0004     Code Status (Patient has no pulse and is not breathing):    CPR (Attempt to Resuscitate)     Medical Interventions (Patient has pulse or is breathing):    Full Support       Future Appointments   Date Time Provider Department Center   2/18/2025 11:00 AM Katherin Rios MD [facility]       Additional Instructions for the Follow-ups that You Need to Schedule       Discharge Follow-up with PCP   As directed       Currently Documented PCP:    Mansoor Barragan PA    PCP Phone Number:    [phone]     Follow Up Details: followup with PCP in one week        Discharge Follow-up with Specialty: follow up with [facility] at next available appointment   As directed      Specialty: follow up with [facility] at next available appointment        Discharge Follow-up with Specialty: followup with Gastroenterology Dr Rios as scheduled   As directed      Specialty: followup with Gastroenterology Dr Rios as scheduled                      James Carranza MD  12/15/24      Time Spent on Discharge:  I spent  35  minutes on this discharge activity which included: face-to-face  encounter with the patient, reviewing the data in the system, coordination of the care with the nursing staff as well as consultants, documentation, and entering orders.

## 2024-12-15 NOTE — PROGRESS NOTES
"GI Daily Progress Note  Subjective:    Chief Complaint: Follow-up elevated liver enzymes    Patient received acetaminophen last night and 35 minutes later began having nausea and multiple episodes of emesis.  She was given some antiemetics with improved symptoms and is feeling better this morning.    Objective:    /58 (BP Location: Left arm, Patient Position: Lying)   Pulse 96   Temp 97.1 °F (36.2 °C) (Oral)   Resp 14   Ht 149.9 cm (59\")   Wt 60.4 kg (133 lb 1.6 oz)   SpO2 93%   BMI 26.88 kg/m²     Physical Exam   General: Patient awake, alert and cooperative   Eyes: Normal lids and lashes, no scleral icterus   Skin: not jaundiced   Cardiovascular: Regular rate, rwell-perfused extremities   Pulm: Equal expansion bilaterally, no increased WOB   Abdomen: nondistended;               Neuro: A&O, No obvious sign of focal deficit   Psychiatric: Normal mood and behavior    Lab  Lab Results   Component Value Date    WBC 6.97 12/14/2024    HGB 13.9 12/14/2024    HGB 14.3 12/13/2024    HGB 16.2 (H) 08/27/2024    MCV 89.0 12/14/2024     12/14/2024    INR 1.18 (H) 12/13/2024    INR 1.04 08/27/2024    INR 1.04 08/16/2024       Lab Results   Component Value Date    GLUCOSE 95 12/15/2024    BUN 9 12/15/2024    CREATININE 0.58 12/15/2024    BCR 15.5 12/15/2024     12/15/2024    K 3.6 12/15/2024    CO2 27.0 12/15/2024    CALCIUM 9.0 12/15/2024    ALBUMIN 3.5 12/15/2024    ALKPHOS 178 (H) 12/15/2024    BILITOT 0.7 12/15/2024     (H) 12/15/2024     (H) 12/15/2024       Assessment:  Elevated liver enzymes  Autoimmune hepatitis (elevated IgG, elevated smooth muscle antibody, elevated TIP, biopsy August 2024)  Nausea and vomiting, now suspect Imuran induced  Reported diarrhea, resolved, GI PCR panel negative    Plan:  -Rechallenge with Imuran last night had severe nausea and vomiting following and therefore she is intolerant to Imuran and will not continue  -Will start on budesonide 9 mg and she " will continue this until she has follow-up with Dr. Rios in GI office however really encouraged patient to move up her appointment with [facility] and will see if our office can help facilitate this as well  -Okay with discharge from GI standpoint    Sherman Hopper MD  12/15/24  08:08 EST

## 2024-12-15 NOTE — PLAN OF CARE
Pt began vomiting after the administration of Imeron. Zofran tablet given see MAR but vomiting continued. Nurse Prac notified about the situation. Pt's  symptoms improved after one time dose of  IV Compazine. No diarrhea during this shift. Pt resting comfortably at this time. Will continue monitor.     Problem: Adult Inpatient Plan of Care  Goal: Plan of Care Review  Outcome: Progressing  Goal: Patient-Specific Goal (Individualized)  Outcome: Progressing  Goal: Absence of Hospital-Acquired Illness or Injury  Outcome: Progressing  Intervention: Identify and Manage Fall Risk  Description: Perform standard risk assessment on admission using a validated tool or comprehensive approach appropriate to the patient; reassess fall risk frequently, with change in status or transfer to another level of care.  Communicate risk to interprofessional healthcare team; ensure fall risk visible cue.  Determine need for increased observation, equipment and environmental modification, as well as use of supportive, nonskid footwear.  Adjust safety measures to individual needs and identified risk factors.  Reinforce the importance of active participation with fall risk prevention, safety, and physical activity with the patient and family.  Perform regular intentional rounding to assess need for position change, pain assessment and personal needs, including assistance with toileting.  Recent Flowsheet Documentation  Taken 12/15/2024 0400 by Elizabeth Velazco, RN  Safety Promotion/Fall Prevention:   activity supervised   assistive device/personal items within reach   clutter free environment maintained   fall prevention program maintained   lighting adjusted   mobility aid in reach   nonskid shoes/slippers when out of bed   room organization consistent   safety round/check completed  Taken 12/15/2024 0200 by Elizabeth Velazco, RN  Safety Promotion/Fall Prevention:   activity supervised   assistive device/personal items within reach   clutter  free environment maintained   fall prevention program maintained   lighting adjusted   mobility aid in reach   nonskid shoes/slippers when out of bed   room organization consistent   safety round/check completed  Taken 12/15/2024 0000 by Elizabeth Velazco RN  Safety Promotion/Fall Prevention:   activity supervised   assistive device/personal items within reach   clutter free environment maintained   fall prevention program maintained   lighting adjusted   mobility aid in reach   nonskid shoes/slippers when out of bed   room organization consistent   safety round/check completed  Taken 12/14/2024 2200 by Elizabeth Velazco RN  Safety Promotion/Fall Prevention:   activity supervised   assistive device/personal items within reach   clutter free environment maintained   fall prevention program maintained   lighting adjusted   mobility aid in reach   nonskid shoes/slippers when out of bed   room organization consistent   safety round/check completed  Taken 12/14/2024 2000 by Elizabeth Velazco RN  Safety Promotion/Fall Prevention:   activity supervised   assistive device/personal items within reach   clutter free environment maintained   fall prevention program maintained   lighting adjusted   mobility aid in reach   nonskid shoes/slippers when out of bed   room organization consistent   safety round/check completed  Intervention: Prevent Skin Injury  Description: Perform a screening for skin injury risk, such as pressure or moisture-associated skin damage on admission and at regular intervals throughout hospital stay.  Keep all areas of skin (especially folds) clean and dry.  Maintain adequate skin hydration.  Relieve and redistribute pressure and protect bony prominences and skin at risk for injury; implement measures based on patient-specific risk factors.  Match turning and repositioning schedule to clinical condition.  Encourage weight shift frequently; assist with reposition if unable to complete independently.  Float  heels off bed; avoid pressure on the Achilles tendon.  Keep skin free from extended contact with medical devices.  Optimize nutrition and hydration.  Encourage functional activity and mobility, as early as tolerated.  Use aids (e.g., slide boards, mechanical lift) during transfer.  Recent Flowsheet Documentation  Taken 12/15/2024 0400 by Elizabeth Velazco RN  Body Position: position changed independently  Taken 12/15/2024 0200 by Elizabeth Velazco RN  Body Position: position changed independently  Skin Protection:   drying agents applied   transparent dressing maintained   skin sealant/moisture barrier applied   silicone foam dressing in place   incontinence pads utilized  Taken 12/15/2024 0000 by Elizabeth Velazco RN  Body Position: position changed independently  Taken 12/14/2024 2200 by Elizabeth Velazco RN  Body Position: position changed independently  Taken 12/14/2024 2000 by Elizabeth Velazco RN  Body Position: position changed independently  Skin Protection: transparent dressing maintained  Goal: Optimal Comfort and Wellbeing  Outcome: Progressing  Intervention: Provide Person-Centered Care  Description: Use a family-focused approach to care; encourage support system presence and participation.  Develop trust and rapport by proactively providing information, encouraging questions, addressing concerns and offering reassurance.  Acknowledge emotional response to hospitalization.  Recognize and utilize personal coping strategies and strengths; develop goals via shared decision-making.  Honor spiritual and cultural preferences.  Recent Flowsheet Documentation  Taken 12/14/2024 2000 by Elizabeth Velazco RN  Trust Relationship/Rapport:   care explained   choices provided   empathic listening provided   emotional support provided   questions answered   questions encouraged  Goal: Readiness for Transition of Care  Outcome: Progressing     Problem: Diarrhea  Goal: Effective Diarrhea Management  Outcome:  Progressing  Intervention: Manage Diarrhea  Description: Provide fluid and electrolyte replacement to correct any imbalance through use of oral rehydration solution, nasogastric tube or intravenous fluid therapy.  Utilize skin protectant barrier to maintain skin integrity; cleanse gently, thoroughly and promptly after stooling; avoid alcohol-containing wipes.  Continue usual diet; encourage fluids (e.g., broth, soups, fruit juices).  Keep toilet, toileting devices and aids readily accessible and barrier-free to maintain safety.  Provide comfort measures and privacy.  Evaluate need for fecal-containment device to minimize skin exposure.  Implement contact precautions, if infection suspected.  Anticipate pharmacologic therapy, such as antidiarrheal, antiemetic, probiotic or antimicrobial agent, to decrease output.  If diarrhea persists, advocate for identification of underlying cause.  Recent Flowsheet Documentation  Taken 12/15/2024 0400 by Elizabeth Velazco RN  Medication Review/Management: medications reviewed  Taken 12/15/2024 0200 by Elizabeth Velazco RN  Medication Review/Management: medications reviewed  Taken 12/15/2024 0000 by Elizabeth Velazco RN  Medication Review/Management: medications reviewed  Taken 12/14/2024 2200 by Elizabeth Velazco RN  Medication Review/Management: medications reviewed  Taken 12/14/2024 2000 by Elizabeth Velazco RN  Medication Review/Management: medications reviewed   Goal Outcome Evaluation:

## 2024-12-18 LAB
BACTERIA SPEC AEROBE CULT: NORMAL
BACTERIA SPEC AEROBE CULT: NORMAL

## 2025-02-10 DIAGNOSIS — K75.4 AUTOIMMUNE HEPATITIS: ICD-10-CM

## 2025-02-11 RX ORDER — AZATHIOPRINE 50 MG/1
50 TABLET ORAL DAILY
Qty: 90 TABLET | Refills: 1 | Status: SHIPPED | OUTPATIENT
Start: 2025-02-11

## 2025-02-11 NOTE — TELEPHONE ENCOUNTER
Rx Refill Note  Pending Prescriptions:                       Disp   Refills    azaTHIOprine (IMURAN) 50 MG tablet [Pharma*90 tab*1        Sig: TAKE 1 TABLET BY MOUTH EVERY DAY    Last office visit with prescribing clinician: 11/12/2024   Last telemedicine visit with prescribing clinician: Visit date not found   Next office visit with prescribing clinician: Visit date not found         Claudette Jorgensen MA  02/11/25, 07:43 EST  
Home